# Patient Record
Sex: FEMALE | Race: WHITE | NOT HISPANIC OR LATINO | Employment: FULL TIME | URBAN - METROPOLITAN AREA
[De-identification: names, ages, dates, MRNs, and addresses within clinical notes are randomized per-mention and may not be internally consistent; named-entity substitution may affect disease eponyms.]

---

## 2017-02-13 ENCOUNTER — ALLSCRIPTS OFFICE VISIT (OUTPATIENT)
Dept: OTHER | Facility: OTHER | Age: 27
End: 2017-02-13

## 2018-01-14 VITALS
SYSTOLIC BLOOD PRESSURE: 110 MMHG | DIASTOLIC BLOOD PRESSURE: 80 MMHG | BODY MASS INDEX: 24.13 KG/M2 | WEIGHT: 150.13 LBS | HEIGHT: 66 IN | TEMPERATURE: 98.4 F

## 2018-03-13 RX ORDER — CETIRIZINE HYDROCHLORIDE 10 MG/1
1 TABLET ORAL DAILY
COMMUNITY

## 2018-03-13 RX ORDER — ALPRAZOLAM 0.25 MG/1
1 TABLET ORAL EVERY 8 HOURS PRN
COMMUNITY
Start: 2016-10-19 | End: 2020-10-18

## 2018-03-13 RX ORDER — NICOTINE POLACRILEX 2 MG
1 GUM BUCCAL DAILY
COMMUNITY
End: 2020-10-18

## 2018-03-13 RX ORDER — FLUTICASONE PROPIONATE 50 MCG
1 SPRAY, SUSPENSION (ML) NASAL 2 TIMES DAILY
COMMUNITY

## 2018-03-13 RX ORDER — LEVONORGESTREL AND ETHINYL ESTRADIOL AND ETHINYL ESTRADIOL 150-30(84)
1 KIT ORAL DAILY
Refills: 0 | COMMUNITY
Start: 2018-01-25 | End: 2020-10-18

## 2018-03-15 ENCOUNTER — OFFICE VISIT (OUTPATIENT)
Dept: FAMILY MEDICINE CLINIC | Facility: CLINIC | Age: 28
End: 2018-03-15
Payer: COMMERCIAL

## 2018-03-15 VITALS
DIASTOLIC BLOOD PRESSURE: 79 MMHG | SYSTOLIC BLOOD PRESSURE: 134 MMHG | WEIGHT: 153.25 LBS | HEIGHT: 66 IN | TEMPERATURE: 99.4 F | BODY MASS INDEX: 24.63 KG/M2 | HEART RATE: 88 BPM

## 2018-03-15 DIAGNOSIS — Z01.89 ENCOUNTER FOR ROUTINE LABORATORY TESTING: ICD-10-CM

## 2018-03-15 DIAGNOSIS — F41.9 ANXIETY: Primary | ICD-10-CM

## 2018-03-15 PROCEDURE — 99213 OFFICE O/P EST LOW 20 MIN: CPT | Performed by: FAMILY MEDICINE

## 2018-03-15 RX ORDER — SERTRALINE HYDROCHLORIDE 25 MG/1
25 TABLET, FILM COATED ORAL DAILY
Qty: 30 TABLET | Refills: 6 | Status: SHIPPED | OUTPATIENT
Start: 2018-03-15 | End: 2018-12-12 | Stop reason: SDUPTHER

## 2018-03-15 NOTE — PROGRESS NOTES
Assessment/Plan:    No problem-specific Assessment & Plan notes found for this encounter  Diagnoses and all orders for this visit:    Anxiety  -     TSH, 3rd generation with T4 reflex  -     sertraline (ZOLOFT) 25 mg tablet; Take 1 tablet (25 mg total) by mouth daily    Encounter for routine laboratory testing  -     CBC and differential  -     Comprehensive metabolic panel  -     Lipid panel  -     TSH, 3rd generation with T4 reflex  -     Urinalysis with reflex to microscopic    Other orders  -     ALPRAZolam (XANAX) 0 25 mg tablet; Take 1 tablet by mouth every 8 (eight) hours as needed  -     Biotin 1 MG CAPS; Take 1 capsule by mouth daily  -     fluticasone (FLONASE) 50 mcg/act nasal spray; 1 spray into each nostril 2 (two) times a day  -     ASHLYNA 0 15-0 03 &0 01 MG TABS; Take 1 tablet by mouth daily  -     cetirizine (ZYRTEC ALLERGY) 10 mg tablet; Take 1 tablet by mouth daily          Subjective:      Patient ID: Dick He is a 33 y o  female  Follow up for anxiety, pt uses xanax prn, but very infrequently, pt feels the anxiety is getting worse, she has a new job and responsibility, pt does not want to take xanax prn anymore and would like to try an SSRI medication        The following portions of the patient's history were reviewed and updated as appropriate: allergies, current medications, past family history, past medical history, past social history, past surgical history and problem list     Review of Systems   Constitutional: Negative for chills and fever  Respiratory: Negative for shortness of breath and wheezing  Cardiovascular: Negative for chest pain and palpitations  Psychiatric/Behavioral: Negative for suicidal ideas  Objective:      /79   Pulse 88   Temp 99 4 °F (37 4 °C)   Ht 5' 6" (1 676 m)   Wt 69 5 kg (153 lb 4 oz)   BMI 24 74 kg/m²          Physical Exam   Constitutional: She is oriented to person, place, and time   She appears well-developed and well-nourished  No distress  HENT:   Head: Normocephalic and atraumatic  Eyes: Conjunctivae and EOM are normal  Pupils are equal, round, and reactive to light  No scleral icterus  Neck: Normal range of motion  Neck supple  Cardiovascular: Normal rate, regular rhythm and normal heart sounds  No murmur heard  Pulmonary/Chest: Effort normal and breath sounds normal  No respiratory distress  She has no wheezes  She has no rales  Abdominal: Soft  Bowel sounds are normal  She exhibits no distension and no mass  There is no tenderness  There is no rebound and no guarding  Musculoskeletal: She exhibits no edema  Lymphadenopathy:     She has no cervical adenopathy  Neurological: She is alert and oriented to person, place, and time  Skin: Skin is warm and dry  No rash noted  She is not diaphoretic  No erythema  No pallor  Psychiatric: She has a normal mood and affect  Her behavior is normal  Judgment and thought content normal    Nursing note and vitals reviewed

## 2018-04-20 ENCOUNTER — OFFICE VISIT (OUTPATIENT)
Dept: FAMILY MEDICINE CLINIC | Facility: CLINIC | Age: 28
End: 2018-04-20
Payer: COMMERCIAL

## 2018-04-20 VITALS
OXYGEN SATURATION: 99 % | TEMPERATURE: 99.1 F | SYSTOLIC BLOOD PRESSURE: 108 MMHG | WEIGHT: 150 LBS | HEART RATE: 75 BPM | DIASTOLIC BLOOD PRESSURE: 70 MMHG | BODY MASS INDEX: 24.21 KG/M2

## 2018-04-20 DIAGNOSIS — Z00.00 ANNUAL PHYSICAL EXAM: Primary | ICD-10-CM

## 2018-04-20 DIAGNOSIS — E78.00 HYPERCHOLESTEROLEMIA: ICD-10-CM

## 2018-04-20 DIAGNOSIS — F41.9 ANXIETY: ICD-10-CM

## 2018-04-20 PROCEDURE — 99395 PREV VISIT EST AGE 18-39: CPT | Performed by: FAMILY MEDICINE

## 2018-04-20 NOTE — PROGRESS NOTES
Assessment/Plan:    No problem-specific Assessment & Plan notes found for this encounter  Diagnoses and all orders for this visit:    Anxiety    Hypercholesterolemia  Comments:  pt counseled on diet and exercise    Other orders  -     triamcinolone (KENALOG) 0 1 % ointment;           Subjective:      Patient ID: Yolette Price is a 33 y o  female  Pt is taking zoloft daily and states that she feels "100%" better since last visit, pt here for annual PE      Anxiety   Presents for follow-up visit  Patient reports no chest pain, nausea, palpitations, shortness of breath or suicidal ideas  The quality of sleep is good  Hyperlipidemia   This is a chronic problem  The current episode started more than 1 month ago  The problem is uncontrolled  Recent lipid tests were reviewed and are high  Pertinent negatives include no chest pain, myalgias or shortness of breath  The following portions of the patient's history were reviewed and updated as appropriate: allergies, current medications, past family history, past medical history, past social history, past surgical history and problem list     Review of Systems   Constitutional: Negative for chills, fatigue and fever  HENT: Negative  Eyes: Negative  Respiratory: Negative for shortness of breath and wheezing  Cardiovascular: Negative for chest pain and palpitations  Gastrointestinal: Negative for abdominal pain, diarrhea, nausea and vomiting  Endocrine: Negative  Genitourinary: Negative for dysuria  Musculoskeletal: Negative for arthralgias and myalgias  Skin: Negative  Allergic/Immunologic: Negative  Neurological: Negative for seizures and syncope  Hematological: Negative for adenopathy  Psychiatric/Behavioral: Negative  Negative for suicidal ideas           Objective:      /70   Pulse 75   Temp 99 1 °F (37 3 °C)   Wt 68 kg (150 lb)   SpO2 99%   BMI 24 21 kg/m²          Physical Exam   Constitutional: She is oriented to person, place, and time  She appears well-developed and well-nourished  No distress  HENT:   Head: Normocephalic and atraumatic  Right Ear: External ear normal    Left Ear: External ear normal    Nose: Nose normal    Mouth/Throat: Oropharynx is clear and moist    Eyes: Conjunctivae and EOM are normal  Pupils are equal, round, and reactive to light  No scleral icterus  Neck: Normal range of motion  Neck supple  Cardiovascular: Normal rate, regular rhythm and normal heart sounds  No murmur heard  Pulmonary/Chest: Effort normal and breath sounds normal  No respiratory distress  She has no wheezes  She has no rales  Abdominal: Soft  Bowel sounds are normal  She exhibits no distension and no mass  There is no tenderness  There is no rebound and no guarding  Musculoskeletal: Normal range of motion  She exhibits no edema  Lymphadenopathy:     She has no cervical adenopathy  Neurological: She is alert and oriented to person, place, and time  She has normal reflexes  She exhibits normal muscle tone  Skin: Skin is warm and dry  No rash noted  She is not diaphoretic  No erythema  No pallor  Psychiatric: She has a normal mood and affect  Her behavior is normal  Judgment and thought content normal    Nursing note and vitals reviewed

## 2018-06-06 ENCOUNTER — HOSPITAL ENCOUNTER (EMERGENCY)
Facility: HOSPITAL | Age: 28
Discharge: HOME | End: 2018-06-06
Attending: EMERGENCY MEDICINE | Admitting: EMERGENCY MEDICINE
Payer: COMMERCIAL

## 2018-06-06 ENCOUNTER — APPOINTMENT (EMERGENCY)
Dept: RADIOLOGY | Facility: HOSPITAL | Age: 28
End: 2018-06-06
Payer: COMMERCIAL

## 2018-06-06 VITALS
OXYGEN SATURATION: 96 % | BODY MASS INDEX: 24.33 KG/M2 | WEIGHT: 155 LBS | HEART RATE: 69 BPM | TEMPERATURE: 98.3 F | SYSTOLIC BLOOD PRESSURE: 129 MMHG | HEIGHT: 67 IN | DIASTOLIC BLOOD PRESSURE: 84 MMHG | RESPIRATION RATE: 18 BRPM

## 2018-06-06 DIAGNOSIS — S93.409A ANKLE SPRAIN: Primary | ICD-10-CM

## 2018-06-06 PROCEDURE — 73610 X-RAY EXAM OF ANKLE: CPT

## 2018-06-06 PROCEDURE — 99283 EMERGENCY DEPT VISIT LOW MDM: CPT

## 2018-06-06 PROCEDURE — 73630 X-RAY EXAM OF FOOT: CPT

## 2018-06-06 RX ORDER — IBUPROFEN 600 MG/1
600 TABLET ORAL EVERY 6 HOURS PRN
Qty: 28 TABLET | Refills: 0 | Status: SHIPPED | OUTPATIENT
Start: 2018-06-06 | End: 2020-10-18

## 2018-06-06 RX ORDER — IBUPROFEN 400 MG/1
400 TABLET ORAL ONCE
Status: COMPLETED | OUTPATIENT
Start: 2018-06-06 | End: 2018-06-06

## 2018-06-06 RX ADMIN — IBUPROFEN 400 MG: 400 TABLET, FILM COATED ORAL at 18:52

## 2018-06-06 NOTE — ED NOTES
Anant Fuentes getting crutches     [facility], [address] [facility]  06/06/18 1935

## 2018-06-06 NOTE — DISCHARGE INSTRUCTIONS

## 2018-06-07 NOTE — ED PROVIDER NOTES
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 Taking? ALPRAZolam (XANAX) 0 25 mg tablet  Self Yes Yes   Sig: Take 1 tablet by mouth every 8 (eight) hours as needed   ASHLYNA 0 15-0 03 &0 01 MG TABS  Self Yes Yes   Sig: Take 1 tablet by mouth daily   Biotin 1 MG CAPS  Self Yes Yes   Sig: Take 1 capsule by mouth daily   cetirizine (ZYRTEC ALLERGY) 10 mg tablet  Self Yes Yes   Sig: Take 1 tablet by mouth daily   fluticasone (FLONASE) 50 mcg/act nasal spray  Self Yes Yes   Si spray into each nostril 2 (two) times a day   sertraline (ZOLOFT) 25 mg tablet  Self No Yes   Sig: Take 1 tablet (25 mg total) by mouth daily   triamcinolone (KENALOG) 0 1 % ointment  Self Yes Yes      Facility-Administered Medications: None       Past Medical History:   Diagnosis Date    Environmental allergies     last assessed 3/13/16       History reviewed  No pertinent surgical history  Family History   Problem Relation Age of Onset    No Known Problems Mother      I have reviewed and agree with the history as documented  Social History   Substance Use Topics    Smoking status: Never Smoker    Smokeless tobacco: Never Used    Alcohol use Yes      Comment: occasional        Review of Systems   Constitutional: Negative for chills, diaphoresis and fever     Respiratory: Negative for cough, shortness of breath, wheezing and stridor  Cardiovascular: Negative for chest pain, palpitations and leg swelling  Gastrointestinal: Negative for abdominal pain, blood in stool, diarrhea, nausea and vomiting  Genitourinary: Negative for dysuria, frequency and urgency  Musculoskeletal: Negative for neck pain and neck stiffness  Skin: Negative for pallor and rash  Neurological: Negative for dizziness, syncope, weakness, light-headedness and headaches  All other systems reviewed and are negative  Physical Exam  Physical Exam   Constitutional: She is oriented to person, place, and time  She appears well-developed and well-nourished  HENT:   Head: Normocephalic and atraumatic  Eyes: Pupils are equal, round, and reactive to light  Neck: Neck supple  Cardiovascular: Normal rate, regular rhythm, normal heart sounds and intact distal pulses  Pulmonary/Chest: Effort normal and breath sounds normal  No respiratory distress  Abdominal: Soft  Bowel sounds are normal  There is no tenderness  Musculoskeletal: She exhibits edema and tenderness  Tenderness over the lateral mallelous on the right, tenderness on the bottom of the foot, no prox tib/fib tenderness   Neurological: She is alert and oriented to person, place, and time  Skin: Skin is warm and dry  Capillary refill takes less than 2 seconds  No rash noted  No erythema  Vitals reviewed        Vital Signs  ED Triage Vitals [06/06/18 1847]   Temperature Pulse Respirations Blood Pressure SpO2   98 3 °F (36 8 °C) 74 18 140/81 99 %      Temp Source Heart Rate Source Patient Position - Orthostatic VS BP Location FiO2 (%)   Tympanic Monitor -- Left arm --      Pain Score       8           Vitals:    06/06/18 1847 06/06/18 1920   BP: 140/81 129/84   Pulse: 74 69       Visual Acuity      ED Medications  Medications   ibuprofen (MOTRIN) tablet 400 mg (400 mg Oral Given 6/6/18 1852)       Diagnostic Studies  Results Reviewed     None XR foot 3+ views RIGHT   Final Result by Griffin Coello MD (06/06 1928)      No acute osseous abnormality  Workstation performed: [id number]         XR ankle 3+ views RIGHT   Final Result by Griffin Coello MD (06/06 1915)      Prominent tibiotalar joint effusion and lateral soft tissue swelling without an acute fracture  Workstation performed: [id number]                    Procedures  Static Splint Application  Date/Time: 6/6/2018 7:50 PM  Performed by: Arjun Shah by: Kyler Connolly     Patient location:  Bedside  Procedure performed by emergency physician: No (done by tech, evaluated by me afterwards)    Consent:     Consent obtained:  Verbal    Consent given by:  Patient    Risks discussed:  Discoloration, numbness, pain and swelling    Alternatives discussed:  No treatment  Universal protocol:     Patient identity confirmed:  Verbally with patient  Indication:     Indications: sprain/strain    Pre-procedure details:     Sensation:  Normal  Procedure details:     Laterality:  Right    Location:  Ankle    Ankle:  R ankle    Splint type: posterior short leg  Supplies:  Ortho-Glass  Post-procedure details:     Pain:  Unchanged    Sensation:  Normal    Neurovascular Exam: skin pink, capillary refill <2 sec, normal pulses and skin intact, warm, and dry      Patient tolerance of procedure: Tolerated well, no immediate complications             Phone Contacts  ED Phone Contact    ED Course                               MDM  CritCare Time    Disposition  Final diagnoses: Ankle sprain     Time reflects when diagnosis was documented in both MDM as applicable and the Disposition within this note     Time User Action Codes Description Comment    6/6/2018  7:55 PM Meghna Brennan Add [P88 [id number]] Ankle sprain       ED Disposition     ED Disposition Condition Comment    Discharge  Lendel Liner discharge to home/self care      Condition at discharge: Good Follow-up Information     Follow up With Specialties Details Why Contact Info Additional Information    Veneda Stage, DO Family Medicine  As needed [address] [facility]  [phone]       [address] [facility] Emergency Department Emergency Medicine  As needed, If symptoms worsen [address] [facility] [address], [address], [place], [place], Suhas Patterson [address] [facility] Orthopedic Surgery Schedule an appointment as soon as possible for a visit in 1 week  [address] [address] [phone]           Discharge Medication List as of 6/6/2018  7:55 PM      START taking these medications    Details   ibuprofen (MOTRIN) 600 mg tablet Take 1 tablet (600 mg total) by mouth every 6 (six) hours as needed for mild pain for up to 7 days, Starting Wed 6/6/2018, Until Wed 6/13/2018, Print         CONTINUE these medications which have NOT CHANGED    Details   ALPRAZolam (XANAX) 0 25 mg tablet Take 1 tablet by mouth every 8 (eight) hours as needed, Starting Wed 10/19/2016, Historical Med      ASHLYNA 0 15-0 03 &0 01 MG TABS Take 1 tablet by mouth daily, Starting Thu 1/25/2018, Historical Med      Biotin 1 MG CAPS Take 1 capsule by mouth daily, Historical Med      cetirizine (ZYRTEC ALLERGY) 10 mg tablet Take 1 tablet by mouth daily, Historical Med      fluticasone (FLONASE) 50 mcg/act nasal spray 1 spray into each nostril 2 (two) times a day, Historical Med      sertraline (ZOLOFT) 25 mg tablet Take 1 tablet (25 mg total) by mouth daily, Starting Thu 3/15/2018, Normal      triamcinolone (KENALOG) 0 1 % ointment Starting Mon 4/2/2018, Historical Med           No discharge procedures on file      ED Provider  Electronically Signed by           Héctor Sarah MD  06/07/18 2143       Héctor Sarah MD  06/12/18 1724

## 2018-10-09 ENCOUNTER — APPOINTMENT (EMERGENCY)
Dept: CT IMAGING | Facility: HOSPITAL | Age: 28
End: 2018-10-09
Payer: COMMERCIAL

## 2018-10-09 ENCOUNTER — HOSPITAL ENCOUNTER (EMERGENCY)
Facility: HOSPITAL | Age: 28
Discharge: HOME | End: 2018-10-09
Attending: EMERGENCY MEDICINE | Admitting: EMERGENCY MEDICINE
Payer: COMMERCIAL

## 2018-10-09 ENCOUNTER — OFFICE VISIT (OUTPATIENT)
Dept: URGENT CARE | Facility: CLINIC | Age: 28
End: 2018-10-09
Payer: COMMERCIAL

## 2018-10-09 VITALS
SYSTOLIC BLOOD PRESSURE: 140 MMHG | RESPIRATION RATE: 20 BRPM | WEIGHT: 158 LBS | OXYGEN SATURATION: 99 % | BODY MASS INDEX: 25.39 KG/M2 | DIASTOLIC BLOOD PRESSURE: 75 MMHG | HEART RATE: 82 BPM | HEIGHT: 66 IN | TEMPERATURE: 98.2 F

## 2018-10-09 VITALS
OXYGEN SATURATION: 99 % | HEART RATE: 106 BPM | BODY MASS INDEX: 25.07 KG/M2 | DIASTOLIC BLOOD PRESSURE: 86 MMHG | RESPIRATION RATE: 16 BRPM | HEIGHT: 66 IN | TEMPERATURE: 99.4 F | WEIGHT: 156 LBS | SYSTOLIC BLOOD PRESSURE: 122 MMHG

## 2018-10-09 DIAGNOSIS — K51.00 PANCOLITIS (HCC): Primary | ICD-10-CM

## 2018-10-09 DIAGNOSIS — R10.31 RIGHT LOWER QUADRANT ABDOMINAL PAIN: Primary | ICD-10-CM

## 2018-10-09 LAB
ALBUMIN SERPL BCP-MCNC: 3.8 G/DL (ref 3.5–5)
ALP SERPL-CCNC: 43 U/L (ref 46–116)
ALT SERPL W P-5'-P-CCNC: 18 U/L (ref 12–78)
ANION GAP SERPL CALCULATED.3IONS-SCNC: 7 MMOL/L (ref 4–13)
APTT PPP: 27 SECONDS (ref 24–36)
AST SERPL W P-5'-P-CCNC: 19 U/L (ref 5–45)
BASOPHILS # BLD AUTO: 0.03 THOUSANDS/ΜL (ref 0–0.1)
BASOPHILS NFR BLD AUTO: 0 % (ref 0–1)
BILIRUB SERPL-MCNC: 0.4 MG/DL (ref 0.2–1)
BUN SERPL-MCNC: 9 MG/DL (ref 5–25)
CALCIUM SERPL-MCNC: 8.8 MG/DL (ref 8.3–10.1)
CHLORIDE SERPL-SCNC: 101 MMOL/L (ref 100–108)
CLARITY, POC: CLEAR
CO2 SERPL-SCNC: 28 MMOL/L (ref 21–32)
COLOR, POC: YELLOW
CREAT SERPL-MCNC: 1 MG/DL (ref 0.6–1.3)
EOSINOPHIL # BLD AUTO: 0.09 THOUSAND/ΜL (ref 0–0.61)
EOSINOPHIL NFR BLD AUTO: 1 % (ref 0–6)
ERYTHROCYTE [DISTWIDTH] IN BLOOD BY AUTOMATED COUNT: 12.3 % (ref 11.6–15.1)
EXT BILIRUBIN, UA: NORMAL
EXT BLOOD URINE: NORMAL
EXT GLUCOSE, UA: NORMAL
EXT KETONES: NORMAL
EXT NITRITE, UA: NORMAL
EXT PH, UA: 5
EXT PREG TEST URINE: NORMAL
EXT PROTEIN, UA: NORMAL
EXT SPECIFIC GRAVITY, UA: 1.01
EXT UROBILINOGEN: 0.2
GFR SERPL CREATININE-BSD FRML MDRD: 77 ML/MIN/1.73SQ M
GLUCOSE SERPL-MCNC: 93 MG/DL (ref 65–140)
HCT VFR BLD AUTO: 43.3 % (ref 34.8–46.1)
HGB BLD-MCNC: 14.5 G/DL (ref 11.5–15.4)
HOLD SPECIMEN: NORMAL
IMM GRANULOCYTES # BLD AUTO: 0.01 THOUSAND/UL (ref 0–0.2)
IMM GRANULOCYTES NFR BLD AUTO: 0 % (ref 0–2)
INR PPP: 0.93 (ref 0.86–1.17)
LIPASE SERPL-CCNC: 93 U/L (ref 73–393)
LYMPHOCYTES # BLD AUTO: 1.55 THOUSANDS/ΜL (ref 0.6–4.47)
LYMPHOCYTES NFR BLD AUTO: 19 % (ref 14–44)
MCH RBC QN AUTO: 30.1 PG (ref 26.8–34.3)
MCHC RBC AUTO-ENTMCNC: 33.5 G/DL (ref 31.4–37.4)
MCV RBC AUTO: 90 FL (ref 82–98)
MONOCYTES # BLD AUTO: 1 THOUSAND/ΜL (ref 0.17–1.22)
MONOCYTES NFR BLD AUTO: 12 % (ref 4–12)
NEUTROPHILS # BLD AUTO: 5.45 THOUSANDS/ΜL (ref 1.85–7.62)
NEUTS SEG NFR BLD AUTO: 68 % (ref 43–75)
PLATELET # BLD AUTO: 211 THOUSANDS/UL (ref 149–390)
PMV BLD AUTO: 11.1 FL (ref 8.9–12.7)
POTASSIUM SERPL-SCNC: 3.5 MMOL/L (ref 3.5–5.3)
PROT SERPL-MCNC: 7.5 G/DL (ref 6.4–8.2)
PROTHROMBIN TIME: 11.9 SECONDS (ref 11.8–14.2)
RBC # BLD AUTO: 4.81 MILLION/UL (ref 3.81–5.12)
SODIUM SERPL-SCNC: 136 MMOL/L (ref 136–145)
WBC # BLD AUTO: 8.13 THOUSAND/UL (ref 4.31–10.16)
WBC # BLD EST: NORMAL 10*3/UL

## 2018-10-09 PROCEDURE — 85730 THROMBOPLASTIN TIME PARTIAL: CPT | Performed by: EMERGENCY MEDICINE

## 2018-10-09 PROCEDURE — 99284 EMERGENCY DEPT VISIT MOD MDM: CPT

## 2018-10-09 PROCEDURE — 85610 PROTHROMBIN TIME: CPT | Performed by: EMERGENCY MEDICINE

## 2018-10-09 PROCEDURE — 96360 HYDRATION IV INFUSION INIT: CPT

## 2018-10-09 PROCEDURE — 85025 COMPLETE CBC W/AUTO DIFF WBC: CPT

## 2018-10-09 PROCEDURE — 36415 COLL VENOUS BLD VENIPUNCTURE: CPT

## 2018-10-09 PROCEDURE — 81002 URINALYSIS NONAUTO W/O SCOPE: CPT | Performed by: EMERGENCY MEDICINE

## 2018-10-09 PROCEDURE — 83690 ASSAY OF LIPASE: CPT

## 2018-10-09 PROCEDURE — 96361 HYDRATE IV INFUSION ADD-ON: CPT

## 2018-10-09 PROCEDURE — 74177 CT ABD & PELVIS W/CONTRAST: CPT

## 2018-10-09 PROCEDURE — 81025 URINE PREGNANCY TEST: CPT | Performed by: EMERGENCY MEDICINE

## 2018-10-09 PROCEDURE — G0382 LEV 3 HOSP TYPE B ED VISIT: HCPCS | Performed by: EMERGENCY MEDICINE

## 2018-10-09 PROCEDURE — 80053 COMPREHEN METABOLIC PANEL: CPT

## 2018-10-09 RX ORDER — ONDANSETRON 4 MG/1
4 TABLET, ORALLY DISINTEGRATING ORAL EVERY 8 HOURS PRN
Qty: 12 TABLET | Refills: 0 | Status: SHIPPED | OUTPATIENT
Start: 2018-10-09 | End: 2020-10-18

## 2018-10-09 RX ADMIN — SODIUM CHLORIDE 1000 ML: 0.9 INJECTION, SOLUTION INTRAVENOUS at 18:51

## 2018-10-09 RX ADMIN — IOHEXOL 85 ML: 350 INJECTION, SOLUTION INTRAVENOUS at 19:47

## 2018-10-09 NOTE — PROGRESS NOTES
Assessment/Plan:    No problem-specific Assessment & Plan notes found for this encounter  Diagnoses and all orders for this visit:    Right lower quadrant abdominal pain  -     Transfer to other facility          Subjective:      Patient ID: Shelbi Abbasi is a 33 y o  female  Pt c/o N & V, diarrhea for 3 days, no vomiting or BM since this morning; took Imodium this morning  Also c/o diffuse abdominal pain, which, on examination, was most tender in RLQ      Abdominal Pain   This is a new problem  The current episode started in the past 7 days  The onset quality is gradual  The problem occurs constantly  The most recent episode lasted 3 days  The problem has been gradually worsening  The pain is located in the RLQ  The pain is at a severity of 4/10  The pain is moderate  The quality of the pain is sharp  The abdominal pain does not radiate  Associated symptoms include diarrhea, a fever (low grade), nausea and vomiting  Nothing aggravates the pain  She has tried nothing for the symptoms  The treatment provided no relief  The following portions of the patient's history were reviewed and updated as appropriate: current medications, past family history, past medical history, past social history, past surgical history and problem list     Review of Systems   Constitutional: Positive for fever (low grade)  Gastrointestinal: Positive for abdominal pain, diarrhea, nausea and vomiting  All other systems reviewed and are negative  Objective:      /86   Pulse (!) 106   Temp 99 4 °F (37 4 °C)   Resp 16   Ht 5' 6" (1 676 m)   Wt 70 8 kg (156 lb)   LMP 10/09/2018   SpO2 99%   BMI 25 18 kg/m²          Physical Exam   Constitutional: She is oriented to person, place, and time  She appears well-developed and well-nourished  HENT:   Nose: Nose normal    Eyes: Pupils are equal, round, and reactive to light  Neck: Normal range of motion  Cardiovascular: Normal rate      Pulmonary/Chest: Effort normal    Abdominal: Soft  Bowel sounds are normal  There is tenderness (diffuse, especially RLQ)  There is rebound and guarding  Neurological: She is alert and oriented to person, place, and time  Skin: Skin is warm and dry  Psychiatric: She has a normal mood and affect  Her behavior is normal  Judgment and thought content normal    Nursing note and vitals reviewed

## 2018-10-09 NOTE — ED PROVIDER NOTES
History  Chief Complaint   Patient presents with    Abdominal Pain     pt presents to ED from urgent care, c/o abdominal pain and diarrhea the past few days  This is a 16-year-old female who presents with right lower quadrant pain increasing over the past 5 days  No prior abdominal surgeries  She has had nausea vomiting and diarrhea she is currently having her menstrual   Denies any chance of pregnancy  No fevers or chills  She was seen at urgent care and sent here for further evaluation to rule out appendicitis  History provided by:  Patient  Abdominal Pain   Pain location:  RLQ  Pain quality: aching    Pain radiates to:  Does not radiate  Pain severity:  Moderate  Onset quality:  Gradual  Duration:  5 days  Progression:  Worsening  Chronicity:  New  Relieved by:  Nothing  Worsened by:  Palpation  Associated symptoms: diarrhea, nausea and vomiting    Associated symptoms: no fever    Risk factors: has not had multiple surgeries        Prior to Admission Medications   Prescriptions Last Dose Informant Patient Reported? Taking?    ALPRAZolam (XANAX) 0 25 mg tablet  Self Yes No   Sig: Take 1 tablet by mouth every 8 (eight) hours as needed   ASHLYNA 0 15-0 03 &0 01 MG TABS  Self Yes No   Sig: Take 1 tablet by mouth daily   Biotin 1 MG CAPS  Self Yes No   Sig: Take 1 capsule by mouth daily   cetirizine (ZYRTEC ALLERGY) 10 mg tablet  Self Yes No   Sig: Take 1 tablet by mouth daily   fluticasone (FLONASE) 50 mcg/act nasal spray  Self Yes No   Si spray into each nostril 2 (two) times a day   ibuprofen (MOTRIN) 600 mg tablet   No No   Sig: Take 1 tablet (600 mg total) by mouth every 6 (six) hours as needed for mild pain for up to 7 days   sertraline (ZOLOFT) 25 mg tablet  Self No No   Sig: Take 1 tablet (25 mg total) by mouth daily   triamcinolone (KENALOG) 0 1 % ointment  Self Yes No      Facility-Administered Medications: None       Past Medical History:   Diagnosis Date    Environmental allergies last assessed 3/13/16       History reviewed  No pertinent surgical history  Family History   Problem Relation Age of Onset    No Known Problems Mother      I have reviewed and agree with the history as documented  Social History   Substance Use Topics    Smoking status: Never Smoker    Smokeless tobacco: Never Used    Alcohol use Yes      Comment: occasional        Review of Systems   Constitutional: Negative for fever  Gastrointestinal: Positive for abdominal pain, diarrhea, nausea and vomiting  All other systems reviewed and are negative  Physical Exam  Physical Exam   Constitutional: She is oriented to person, place, and time  She appears well-developed and well-nourished  No distress  HENT:   Head: Normocephalic and atraumatic  Right Ear: External ear normal    Left Ear: External ear normal    Nose: Nose normal    Mouth/Throat: Oropharynx is clear and moist    Eyes: Pupils are equal, round, and reactive to light  EOM are normal  No scleral icterus  Neck: Neck supple  No tracheal deviation present  Cardiovascular: Normal rate, regular rhythm, normal heart sounds and intact distal pulses  Exam reveals no gallop and no friction rub  No murmur heard  Pulmonary/Chest: Effort normal and breath sounds normal  No stridor  No respiratory distress  She has no wheezes  She has no rales  Abdominal: Soft  Bowel sounds are normal  She exhibits no distension  There is tenderness  There is no rebound and no guarding (Right lower quadrant)  Musculoskeletal: Normal range of motion  She exhibits no edema or deformity  Neurological: She is alert and oriented to person, place, and time  No cranial nerve deficit  Skin: Skin is warm and dry  No rash noted  She is not diaphoretic  Psychiatric: She has a normal mood and affect  Her behavior is normal  Thought content normal    Nursing note and vitals reviewed        Vital Signs  ED Triage Vitals [10/09/18 1746]   Temperature Pulse Respirations Blood Pressure SpO2   98 2 °F (36 8 °C) 83 16 151/79 100 %      Temp Source Heart Rate Source Patient Position - Orthostatic VS BP Location FiO2 (%)   Temporal Monitor Sitting Left arm --      Pain Score       7           Vitals:    10/09/18 1746 10/09/18 2005 10/09/18 2055   BP: 151/79 138/70 140/75   Pulse: 83 80 82   Patient Position - Orthostatic VS: Sitting         Visual Acuity      ED Medications  Medications   sodium chloride 0 9 % bolus 1,000 mL (0 mL Intravenous Stopped 10/9/18 2055)   iohexol (OMNIPAQUE) 350 MG/ML injection (MULTI-DOSE) 85 mL (85 mL Intravenous Given 10/9/18 1947)       Diagnostic Studies  Results Reviewed     Procedure Component Value Units Date/Time    POCT urinalysis dipstick [[id number]]  (Normal) Resulted:  10/09/18 1903    Lab Status:  Final result Specimen:  Urine Updated:  10/09/18 1904     Color, UA Yellow     Clarity, UA Clear     EXT Glucose, UA (Ref: Negative) Neg     EXT Bilirubin, UA (Ref: Negative) Neg     Ketones, UA (Ref: Negative) Neg     EXT Spec Grav, UA (Ref:1 003-1 030) 1 010     Blood, UA (Ref: Negative) Neg     EXT pH, UA (Ref: 4 5-8 0) 5 0     EXT Protein, UA (Ref: Negative) Neg     Urobilinogen, UA (Ref: 0 2- 1 0) 0 2      Leukocytes, UA (Ref: Negative) Neg     Nitrite, UA (Ref: Negative) Neg    POCT pregnancy, urine [[id number]]  (Normal) Resulted:  10/09/18 1901    Lab Status:  Final result Updated:  10/09/18 1903     EXT PREG TEST UR (Ref: Negative) NEG    Protime-INR [[id number]]  (Normal) Collected:  10/09/18 1751    Lab Status:  Final result Specimen:  Blood from Arm, Right Updated:  10/09/18 1857     Protime 11 9 seconds      INR 0 93    APTT [[id number]]  (Normal) Collected:  10/09/18 1751    Lab Status:  Final result Specimen:  Blood from Arm, Right Updated:  10/09/18 1857     PTT 27 seconds     Comprehensive metabolic panel [[id number]]  (Abnormal) Collected:  10/09/18 1751    Lab Status:  Final result Specimen:  Blood from Arm, Right Updated: 10/09/18 1823     Sodium 136 mmol/L      Potassium 3 5 mmol/L      Chloride 101 mmol/L      CO2 28 mmol/L      ANION GAP 7 mmol/L      BUN 9 mg/dL      Creatinine 1 00 mg/dL      Glucose 93 mg/dL      Calcium 8 8 mg/dL      AST 19 U/L      ALT 18 U/L      Alkaline Phosphatase 43 (L) U/L      Total Protein 7 5 g/dL      Albumin 3 8 g/dL      Total Bilirubin 0 40 mg/dL      eGFR 77 ml/min/1 73sq m     Narrative:         National Kidney Disease Education Program recommendations are as follows:  GFR calculation is accurate only with a steady state creatinine  Chronic Kidney disease less than 60 ml/min/1 73 sq  meters  Kidney failure less than 15 ml/min/1 73 sq  meters  Lipase [[id number]]  (Normal) Collected:  10/09/18 1751    Lab Status:  Final result Specimen:  Blood from Arm, Right Updated:  10/09/18 1823     Lipase 93 u/L     CBC and differential [[id number]]  (Normal) Collected:  10/09/18 1751    Lab Status:  Final result Specimen:  Blood from Arm, Right Updated:  10/09/18 1806     WBC 8 13 Thousand/uL      RBC 4 81 Million/uL      Hemoglobin 14 5 g/dL      Hematocrit 43 3 %      MCV 90 fL      MCH 30 1 pg      MCHC 33 5 g/dL      RDW 12 3 %      MPV 11 1 fL      Platelets 022 Thousands/uL      Neutrophils Relative 68 %      Immat GRANS % 0 %      Lymphocytes Relative 19 %      Monocytes Relative 12 %      Eosinophils Relative 1 %      Basophils Relative 0 %      Neutrophils Absolute 5 45 Thousands/µL      Immature Grans Absolute 0 01 Thousand/uL      Lymphocytes Absolute 1 55 Thousands/µL      Monocytes Absolute 1 00 Thousand/µL      Eosinophils Absolute 0 09 Thousand/µL      Basophils Absolute 0 03 Thousands/µL                  CT abdomen pelvis with contrast   Final Result by Va Navarro MD (10/09 1958)      Pancolitis              Workstation performed: [id number]                    Procedures  Procedures       Phone Contacts  ED Phone Contact    ED Course                               MDM  Number of Diagnoses or Management Options  Diagnosis management comments: Right lower quadrant pain differential includes acute appendicitis versus gastroenteritis or ovarian cyst will check labs and CT scan for further evaluation       Amount and/or Complexity of Data Reviewed  Clinical lab tests: ordered  Tests in the radiology section of CPT®: ordered      CritCare Time    Disposition  Final diagnoses:   Pancolitis ([address] )     Time reflects when diagnosis was documented in both MDM as applicable and the Disposition within this note     Time User Action Codes Description Comment    10/9/2018  7:04 PM Destinee Renato Add [R10 31] Right lower quadrant pain     10/9/2018  7:04 PM Candler Hany [R11 10] Vomiting     10/9/2018  7:05 PM Destinee Renato Add [R19 7] Diarrhea     10/9/2018  8:30 PM Tadevosyan, Meghna Modify [R11 10] Vomiting     10/9/2018  8:30 PM Tadevosyan, Meghna Remove [R10 31] Right lower quadrant pain     10/9/2018  8:30 PM Tadevosyan, Meghna Modify [R19 7] Diarrhea     10/9/2018  8:30 PM Tadevosyan, Meghna Remove [R11 10] Vomiting     10/9/2018  8:30 PM Tadevosyan, Meghna Remove [R19 7] Diarrhea     10/9/2018  8:30 PM Tadevosyan, Meghna Add [K51 00] Pancolitis [facility])       ED Disposition     ED Disposition Condition Comment    Discharge  Jeannette Willoughby discharge to home/self care      Condition at discharge: Good        Follow-up Information     Follow up With Specialties Details Why Contact Info Additional Information    [facility] Emergency Department Emergency Medicine  As needed, If symptoms worsen 450 [facility]  34[address] [facility] [address] [place] [address], [address], [place], [address] [facility], [address] [facility], 1000 [facility] Gastroenterology Call in 1 day make an appointment for tomorrow 250 [facility] 1000 [facility]  [phone]             Discharge Medication List as of 10/9/2018  8:34 PM      START taking these medications    Details   ondansetron (ZOFRAN-ODT) 4 mg disintegrating tablet Take 1 tablet (4 mg total) by mouth every 8 (eight) hours as needed for nausea or vomiting for up to 4 days, Starting Tue 10/9/2018, Until Sat 10/13/2018, Print         CONTINUE these medications which have NOT CHANGED    Details   ALPRAZolam (XANAX) 0 25 mg tablet Take 1 tablet by mouth every 8 (eight) hours as needed, Starting Wed 10/19/2016, Historical Med      ASHLYNA 0 15-0 03 &0 01 MG TABS Take 1 tablet by mouth daily, Starting Thu 1/25/2018, Historical Med      Biotin 1 MG CAPS Take 1 capsule by mouth daily, Historical Med      cetirizine (ZYRTEC ALLERGY) 10 mg tablet Take 1 tablet by mouth daily, Historical Med      fluticasone (FLONASE) 50 mcg/act nasal spray 1 spray into each nostril 2 (two) times a day, Historical Med      ibuprofen (MOTRIN) 600 mg tablet Take 1 tablet (600 mg total) by mouth every 6 (six) hours as needed for mild pain for up to 7 days, Starting Wed 6/6/2018, Until Wed 6/13/2018, Print      sertraline (ZOLOFT) 25 mg tablet Take 1 tablet (25 mg total) by mouth daily, Starting Thu 3/15/2018, Normal      triamcinolone (KENALOG) 0 1 % ointment Starting Mon 4/2/2018, Historical Med             Outpatient Discharge Orders  Clostridium difficile toxin by PCR   Standing Status: Future Number of Occurrences: 1 Standing Exp   Date: 10/09/19         ED Provider  Electronically Signed by           Aaron Richards DO  10/12/18 2059

## 2018-10-10 ENCOUNTER — APPOINTMENT (OUTPATIENT)
Dept: LAB | Facility: HOSPITAL | Age: 28
End: 2018-10-10
Attending: EMERGENCY MEDICINE
Payer: COMMERCIAL

## 2018-10-10 DIAGNOSIS — K51.00 PANCOLITIS (HCC): ICD-10-CM

## 2018-10-10 LAB — C DIFF TOX GENS STL QL NAA+PROBE: NORMAL

## 2018-10-10 PROCEDURE — 87493 C DIFF AMPLIFIED PROBE: CPT

## 2018-10-10 NOTE — DISCHARGE INSTRUCTIONS
Colitis   WHAT YOU NEED TO KNOW:   Colitis is swelling and irritation of your colon  Colitis may be caused by ulcers or a problem with your immune system  Bacteria, a virus, or a parasite may also cause colitis  The cause may not be known  You may have diarrhea, abdominal pain, fever, or blood or mucus in your bowel movement  DISCHARGE INSTRUCTIONS:   Return to the emergency department if:   · You have sudden trouble breathing  · Your bowel movements are black or have blood in them  · You have blood in your vomit  · You have severe abdominal pain or your abdomen is swollen and feels hard  · You have any of the following signs of dehydration:     ¨ Dizziness or weakness    ¨ Dry mouth, cracked lips, or severe thirst    ¨ Fast heartbeat or breathing    ¨ Urinating very little or not at all  Contact your healthcare provider if:   · Your symptoms get worse or do not go away  · You have a fever, chills, cough, or feel weak and achy  · You suddenly lose weight without trying  · You have questions or concerns about your condition or care  Medicines:   · Medicines  may be given to decrease inflammation in your colon and treat diarrhea  · Take your medicine as directed  Contact your healthcare provider if you think your medicine is not helping or if you have side effects  Tell him of her if you are allergic to any medicine  Keep a list of the medicines, vitamins, and herbs you take  Include the amounts, and when and why you take them  Bring the list or the pill bottles to follow-up visits  Carry your medicine list with you in case of an emergency  Manage your symptoms:   · Drink liquids as directed  to help prevent dehydration  Good liquids to drink include water, juice, and broth  Ask how much liquid to drink each day  You may need to drink an oral rehydration solution (ORS)  An ORS contains a balance of water, salt, and sugar to replace body fluids lost during diarrhea       · Eat a variety of healthy foods  Healthy foods include fruits, vegetables, whole-grain breads, beans, low-fat dairy products, lean meats, and fish  You may need to eat several small meals throughout the day instead of large meals  Avoid spicy foods, caffeine, chocolate, and foods high in fat  · Talk to your healthcare provider before you take NSAIDs  NSAIDs can cause worsen your symptoms if ulcers are causing your colitis  · Start to exercise when you feel better  Regular exercise helps your bowels work normally  Ask about the best exercise plan for you  Follow up with your healthcare provider as directed: You may need to return for a colonoscopy or other tests  Write down how often you have a bowel movements and what they look like  Bring this to your follow-up visits  Write down your questions so you remember to ask them during your visits  © 2017 [address] [employer] Information is for End User's use only and may not be sold, redistributed or otherwise used for commercial purposes  All illustrations and images included in CareNotes® are the copyrighted property of [employer]  or [employer]  The above information is an  only  It is not intended as medical advice for individual conditions or treatments  Talk to your doctor, nurse or pharmacist before following any medical regimen to see if it is safe and effective for you

## 2018-10-10 NOTE — ED CARE HANDOFF
Emergency Department Sign Out Note        Sign out and transfer of care from Dr Antunez Brought  See Separate Emergency Department note  The patient, Kal Speaker, was evaluated by the previous provider for Abd pain  Workup Completed:  Abdominal labs, waiting for CT scan to be read    ED Course / Workup Pending (followup):  Spoke with Dr Bhakti Hein from GI, no antibiotics, to see in office gosia nguyen script as outaptient       patient works in healthcare field  Discussed CT scan findings with GI                     ED Course as of Oct 10 0253   Tue Oct 09, 2018   2017 GI paged, waiting for call back      Procedures  MDM  CritCare Time      Disposition  Final diagnoses:   [facility])     Time reflects when diagnosis was documented in both MDM as applicable and the Disposition within this note     Time User Action Codes Description Comment    10/9/2018  7:04 PM Creed Snuffer Add [R10 31] Right lower quadrant pain     10/9/2018  7:04 PM Creed Snuffer Add [R11 10] Vomiting     10/9/2018  7:05 PM Creed Snuffer Add [R19 7] Diarrhea     10/9/2018  8:30 PM Tadevosyan, Meghna Modify [R11 10] Vomiting     10/9/2018  8:30 PM Tadevosyan, Meghna Remove [R10 31] Right lower quadrant pain     10/9/2018  8:30 PM Tadevosyan, Meghna Modify [R19 7] Diarrhea     10/9/2018  8:30 PM Tadevosyan, Meghna Remove [R11 10] Vomiting     10/9/2018  8:30 PM Tadevosyan, Meghna Remove [R19 7] Diarrhea     10/9/2018  8:30 PM Tadevosyan, Meghna Add [K51 00] [facility])       ED Disposition     ED Disposition Condition Comment    Discharge  Kal Speaker discharge to home/self care      Condition at discharge: Good        Follow-up Information     Follow up With Specialties Details Why Contact Info Additional Information    [address] Emergency Department Emergency Medicine  As needed, If symptoms worsen 450 [facility]  [address] [place] [address] [address] [place] [address] ED, [address], [address] Gastroenterology Call in 1 day make an appointment for tomorrow [address] [facility] [address]  [phone]           Discharge Medication List as of 10/9/2018  8:34 PM      START taking these medications    Details   ondansetron (ZOFRAN-ODT) 4 mg disintegrating tablet Take 1 tablet (4 mg total) by mouth every 8 (eight) hours as needed for nausea or vomiting for up to 4 days, Starting Tue 10/9/2018, Until Sat 10/13/2018, Print         CONTINUE these medications which have NOT CHANGED    Details   ALPRAZolam (XANAX) 0 25 mg tablet Take 1 tablet by mouth every 8 (eight) hours as needed, Starting Wed 10/19/2016, Historical Med      ASHLYNA 0 15-0 03 &0 01 MG TABS Take 1 tablet by mouth daily, Starting Thu 1/25/2018, Historical Med      Biotin 1 MG CAPS Take 1 capsule by mouth daily, Historical Med      cetirizine (ZYRTEC ALLERGY) 10 mg tablet Take 1 tablet by mouth daily, Historical Med      fluticasone (FLONASE) 50 mcg/act nasal spray 1 spray into each nostril 2 (two) times a day, Historical Med      ibuprofen (MOTRIN) 600 mg tablet Take 1 tablet (600 mg total) by mouth every 6 (six) hours as needed for mild pain for up to 7 days, Starting Wed 6/6/2018, Until Wed 6/13/2018, Print      sertraline (ZOLOFT) 25 mg tablet Take 1 tablet (25 mg total) by mouth daily, Starting Thu 3/15/2018, Normal      triamcinolone (KENALOG) 0 1 % ointment Starting Mon 4/2/2018, Historical Med             Outpatient Discharge Orders  Clostridium difficile toxin by PCR   Standing Status: Future  Standing Exp   Date: 10/09/19            ED Provider  Electronically Signed by     Mehrdad Rachel MD  10/10/18 8321

## 2018-11-13 ENCOUNTER — OFFICE VISIT (OUTPATIENT)
Dept: FAMILY MEDICINE CLINIC | Facility: CLINIC | Age: 28
End: 2018-11-13

## 2018-11-13 VITALS
HEIGHT: 66 IN | WEIGHT: 149.4 LBS | TEMPERATURE: 97.9 F | SYSTOLIC BLOOD PRESSURE: 110 MMHG | DIASTOLIC BLOOD PRESSURE: 78 MMHG | BODY MASS INDEX: 24.01 KG/M2

## 2018-11-13 DIAGNOSIS — F41.9 ANXIETY: Primary | ICD-10-CM

## 2018-11-13 DIAGNOSIS — Z13.6 SCREENING FOR CARDIOVASCULAR CONDITION: ICD-10-CM

## 2018-11-13 PROCEDURE — 99213 OFFICE O/P EST LOW 20 MIN: CPT | Performed by: FAMILY MEDICINE

## 2018-11-13 PROCEDURE — 3008F BODY MASS INDEX DOCD: CPT | Performed by: FAMILY MEDICINE

## 2018-11-13 PROCEDURE — 1036F TOBACCO NON-USER: CPT | Performed by: FAMILY MEDICINE

## 2018-11-13 NOTE — PROGRESS NOTES
Assessment/Plan:    No problem-specific Assessment & Plan notes found for this encounter  Diagnoses and all orders for this visit:    Anxiety  Comments:  no change in the medications    Screening for cardiovascular condition  -     CBC and differential; Future  -     Comprehensive metabolic panel; Future  -     TSH, 3rd generation with Free T4 reflex; Future  -     Lipid panel; Future    Other orders  -     Albuterol Sulfate 108 (90 Base) MCG/ACT AEPB; 1 applicator          Subjective:      Patient ID: Mary Pope is a 33 y o  female  Pt is on zoloft 25 mg daily and xanax prn which she is not even using      Anxiety   Presents for follow-up visit  Patient reports no chest pain, excessive worry, irritability, nervous/anxious behavior, palpitations, panic, restlessness, shortness of breath or suicidal ideas  Symptoms occur rarely  The severity of symptoms is mild  The following portions of the patient's history were reviewed and updated as appropriate: allergies, current medications, past family history, past medical history, past social history, past surgical history and problem list     Review of Systems   Constitutional: Negative for fatigue and irritability  Respiratory: Negative for shortness of breath and wheezing  Cardiovascular: Negative for chest pain and palpitations  Psychiatric/Behavioral: Negative for suicidal ideas  The patient is not nervous/anxious  Objective:      /78 (BP Location: Left arm, Patient Position: Sitting, Cuff Size: Adult)   Temp 97 9 °F (36 6 °C) (Tympanic)   Ht 5' 6" (1 676 m)   Wt 67 8 kg (149 lb 6 4 oz)   BMI 24 11 kg/m²          Physical Exam   Constitutional: She is oriented to person, place, and time  She appears well-developed and well-nourished  No distress  HENT:   Head: Normocephalic and atraumatic  Eyes: Pupils are equal, round, and reactive to light  Conjunctivae and EOM are normal  No scleral icterus     Neck: Normal range of motion  Neck supple  Cardiovascular: Normal rate, regular rhythm and normal heart sounds  No murmur heard  Pulmonary/Chest: Effort normal and breath sounds normal  No respiratory distress  She has no wheezes  She has no rales  Musculoskeletal: She exhibits no edema  Lymphadenopathy:     She has no cervical adenopathy  Neurological: She is alert and oriented to person, place, and time  She exhibits normal muscle tone  Skin: Skin is warm and dry  No rash noted  She is not diaphoretic  No erythema  No pallor  Psychiatric: She has a normal mood and affect  Her behavior is normal  Judgment and thought content normal    Nursing note and vitals reviewed

## 2018-12-12 DIAGNOSIS — F41.9 ANXIETY: ICD-10-CM

## 2018-12-12 RX ORDER — SERTRALINE HYDROCHLORIDE 25 MG/1
TABLET, FILM COATED ORAL
Qty: 30 TABLET | Refills: 5 | Status: SHIPPED | OUTPATIENT
Start: 2018-12-12 | End: 2020-10-18

## 2020-10-18 ENCOUNTER — HOSPITAL ENCOUNTER (EMERGENCY)
Facility: HOSPITAL | Age: 30
Discharge: HOME | End: 2020-10-18
Attending: EMERGENCY MEDICINE | Admitting: EMERGENCY MEDICINE
Payer: COMMERCIAL

## 2020-10-18 ENCOUNTER — APPOINTMENT (EMERGENCY)
Dept: RADIOLOGY | Facility: HOSPITAL | Age: 30
End: 2020-10-18
Payer: COMMERCIAL

## 2020-10-18 VITALS
DIASTOLIC BLOOD PRESSURE: 69 MMHG | RESPIRATION RATE: 18 BRPM | HEART RATE: 96 BPM | OXYGEN SATURATION: 98 % | TEMPERATURE: 98.1 F | SYSTOLIC BLOOD PRESSURE: 116 MMHG

## 2020-10-18 DIAGNOSIS — S93.402A SPRAIN OF LEFT ANKLE: Primary | ICD-10-CM

## 2020-10-18 PROCEDURE — 73610 X-RAY EXAM OF ANKLE: CPT

## 2020-10-18 PROCEDURE — 99283 EMERGENCY DEPT VISIT LOW MDM: CPT

## 2020-10-18 PROCEDURE — 99284 EMERGENCY DEPT VISIT MOD MDM: CPT | Performed by: PHYSICIAN ASSISTANT

## 2020-10-18 RX ORDER — IBUPROFEN 600 MG/1
600 TABLET ORAL ONCE
Status: COMPLETED | OUTPATIENT
Start: 2020-10-18 | End: 2020-10-18

## 2020-10-18 RX ADMIN — IBUPROFEN 600 MG: 600 TABLET, FILM COATED ORAL at 20:37

## 2021-09-01 ENCOUNTER — OCCMED (OUTPATIENT)
Dept: URGENT CARE | Facility: CLINIC | Age: 31
End: 2021-09-01

## 2021-09-01 DIAGNOSIS — Z02.1 PRE-EMPLOYMENT HEALTH SCREENING EXAMINATION: Primary | ICD-10-CM

## 2021-09-01 PROCEDURE — 86735 MUMPS ANTIBODY: CPT | Performed by: PHYSICIAN ASSISTANT

## 2021-09-01 PROCEDURE — 86480 TB TEST CELL IMMUN MEASURE: CPT | Performed by: PHYSICIAN ASSISTANT

## 2021-09-01 PROCEDURE — 86762 RUBELLA ANTIBODY: CPT | Performed by: PHYSICIAN ASSISTANT

## 2021-09-01 PROCEDURE — 86765 RUBEOLA ANTIBODY: CPT | Performed by: PHYSICIAN ASSISTANT

## 2021-09-01 PROCEDURE — 86787 VARICELLA-ZOSTER ANTIBODY: CPT | Performed by: PHYSICIAN ASSISTANT

## 2021-09-01 NOTE — PROGRESS NOTES
This encounter was created for [facility] orders only   [de-identified] : POST OP RICCO W/WO NAMRATA\par \par COLD TX, ANALGESICS PRN,,WOUND CARE REVIEWED\par \par START PENDULUM TID - PROGRESS TO AAROM 48 HOURS\par \par START PT WITHIN 7-14 DAYS\par \par START CARDIO 2 WEEKS PRN\par \par AVOID GYM, HEAVY LIFTING 6-12 WEEKS \par \par FU 6 WEEKS\par

## 2021-09-02 LAB
MEV IGG SER QL: NORMAL
MUV IGG SER QL: NORMAL
RUBV IGG SERPL IA-ACNC: >175 IU/ML
VZV IGG SER IA-ACNC: NORMAL

## 2021-09-03 LAB
GAMMA INTERFERON BACKGROUND BLD IA-ACNC: 0.02 IU/ML
M TB IFN-G BLD-IMP: NEGATIVE
M TB IFN-G CD4+ BCKGRND COR BLD-ACNC: 0.31 IU/ML
M TB IFN-G CD4+ BCKGRND COR BLD-ACNC: 0.33 IU/ML
MITOGEN IGNF BCKGRD COR BLD-ACNC: >10 IU/ML

## 2021-10-12 ENCOUNTER — IMMUNIZATIONS (OUTPATIENT)
Dept: FAMILY MEDICINE CLINIC | Facility: HOSPITAL | Age: 31
End: 2021-10-12

## 2021-10-12 DIAGNOSIS — Z23 ENCOUNTER FOR IMMUNIZATION: Primary | ICD-10-CM

## 2021-10-12 PROCEDURE — 0001A SARS-COV-2 / COVID-19 MRNA VACCINE (PFIZER-BIONTECH) 30 MCG: CPT

## 2021-10-12 PROCEDURE — 91300 SARS-COV-2 / COVID-19 MRNA VACCINE (PFIZER-BIONTECH) 30 MCG: CPT

## 2022-05-03 ENCOUNTER — COSMETIC (OUTPATIENT)
Dept: PLASTIC SURGERY | Facility: CLINIC | Age: 32
End: 2022-05-03

## 2022-05-03 VITALS
HEART RATE: 84 BPM | TEMPERATURE: 98.2 F | DIASTOLIC BLOOD PRESSURE: 78 MMHG | HEIGHT: 66 IN | SYSTOLIC BLOOD PRESSURE: 114 MMHG | BODY MASS INDEX: 23.63 KG/M2 | WEIGHT: 147 LBS

## 2022-05-03 DIAGNOSIS — Z41.1 ENCOUNTER FOR COSMETIC SURGERY: Primary | ICD-10-CM

## 2022-05-03 PROCEDURE — COSCON: Performed by: STUDENT IN AN ORGANIZED HEALTH CARE EDUCATION/TRAINING PROGRAM

## 2022-05-04 NOTE — PROGRESS NOTES
Plastic Surgery Consult    Reason for visit: Interested in breast augmentation      HPI:  Patient is a pleasant 27 y/o female who is interested in bilateral breast augmentation  She currently wears an A cup and would like to be full C cup  She has no medical conditions, no history of blood clots, and is done having children  She has never had a mammogram  She has never noted any nipple discharge, skin dimpling, or masses  ROS: 12 pt ROS negative, except as otherwise noted in HPI      PMH: none  FamHx: non-contrib  SurgHx: none  SocHx: no tobacco, social ETOH, done having children  Meds: no blood thinners  Allergies: dust mites, cinnamon, no drug allergies    PE:  Vitals:    05/03/22 0923   BP: 114/78   Pulse: 84   Temp: 98 2 °F (36 8 °C)       General: NC/AT, breathing comfortably on RA  Neuro: CN II-XII grossly intact, symmetric reflexes  HEENT: PERRLA, EOMI, external ears normal, no lesions or deformities, neck supple, trachea midline  Respiratory: CTAB, normal respiratory effort  Cardio: RRR, normal S1, S2, no murmur, rubs, gallops  GI: soft, non-tender, non-distended  Extremities/MSK: normal alignment, mobility, gait, no edema  Skin: no rashes, lesions, subcutaneous nodules    BMI 23    Breast exam  Small breasts bilaterally, left breast slightly wider than right  Normal nipple sensation  No masses, discharge, skin dimpling  More pseudoptosis on the right than left  Mild left chest wall prominence  Measurements    R  L  SN-N  21 cm  21 m  N-IMF  8 cm  8 cm  N-IMF (stretch)9 5 cm  9 5 cm  BW  12 5 cm 12 5 cm    Less than 2 cm superior skin pinch    A/P: 27 y/o female who is good candidate for bilateral breast augmentation  -Discussed risks, benefits, complications of silicone vs saline breast augmentation including but not limited to infection, bleeding, scarring, asymmetry, contour deformity, need for further future surgery, leak, rupture, capsular contracture, BII, ALCL   Discussed pocket placement in regards to retroglandular and submuscular and dual-plane with advantages and disadvantages of each including but not limited to capsular contracture and animation deformity   -Discussed operation and post-operative course  -Patient tried sizers on today  Currently wears A-cup would like to be full C cup  She states that the 450 cc are too big, would like between 350-425 cc and would rather be on the slightly larger side  Current selection: 393-383 cc silicone moderate- mod plus, submuscular placement   -Patient questions and concerns addressed, including cleavage   -Will email quote          Angi Villalobos MD   [facility] Plastic and Reconstructive Surgery   [address], [address]   [place], [address]   Office: [phone]

## 2022-07-27 ENCOUNTER — COSMETIC (OUTPATIENT)
Dept: PLASTIC SURGERY | Facility: CLINIC | Age: 32
End: 2022-07-27

## 2022-07-27 VITALS
TEMPERATURE: 98 F | WEIGHT: 150 LBS | HEIGHT: 66 IN | DIASTOLIC BLOOD PRESSURE: 76 MMHG | HEART RATE: 95 BPM | SYSTOLIC BLOOD PRESSURE: 134 MMHG | BODY MASS INDEX: 24.11 KG/M2

## 2022-07-27 DIAGNOSIS — Z41.1 ENCOUNTER FOR COSMETIC SURGERY: Primary | ICD-10-CM

## 2022-07-29 RX ORDER — DOCUSATE SODIUM 100 MG/1
100 CAPSULE, LIQUID FILLED ORAL 2 TIMES DAILY
Qty: 20 CAPSULE | Refills: 1 | Status: SHIPPED | OUTPATIENT
Start: 2022-07-29

## 2022-07-29 RX ORDER — ACETAMINOPHEN 500 MG
500 TABLET ORAL EVERY 4 HOURS PRN
Qty: 30 TABLET | Refills: 2 | Status: SHIPPED | OUTPATIENT
Start: 2022-07-29

## 2022-07-29 RX ORDER — ONDANSETRON 4 MG/1
4 TABLET, FILM COATED ORAL EVERY 8 HOURS PRN
Qty: 20 TABLET | Refills: 0 | Status: SHIPPED | OUTPATIENT
Start: 2022-07-29

## 2022-07-29 RX ORDER — OXYCODONE HYDROCHLORIDE 5 MG/1
5 TABLET ORAL EVERY 4 HOURS PRN
Qty: 30 TABLET | Refills: 0 | Status: SHIPPED | OUTPATIENT
Start: 2022-07-29

## 2022-07-30 NOTE — PROGRESS NOTES
PRS Note    Patient presents for preop for bilateral augmentation mammoplasty with silicone implants     -Discussed risks, benefits, complications of silicone vs saline breast augmentation including but not limited to infection, bleeding, scarring, asymmetry, contour deformity, need for further future surgery, leak, rupture, capsular contracture, BII, ALCL  Discussed pocket placement in regards to subglandular and submuscular and dual-plane with advantages and disadvantages of each including but not limited to capsular contracture and animation deformity  Patient is done having children     -Discussed operation and post-operative course  -Patient tried sizers on today for confirmation of size  Currently wears A-cup would like to be full C cup  She states that the 450 cc are too big, would like between 350-425 cc and would rather be on the slightly larger side  Final size selection: 664-346 cc silicone moderate- mod plus, submuscular placement     -Patient questions and concerns addressed, including cleavage and pseudoptosis  Discussed existing asymmetries including left chest wall prominence which will be magnified with augmentation  Patient acknowledged  All questions answered, concerns addressed  Consents obtained    Will order implants  Will send postop prescriptions to pharmacy today    Marito Doty MD   [facility]   [address], [address], [address]   Office: [phone]

## 2022-08-02 ENCOUNTER — APPOINTMENT (OUTPATIENT)
Dept: LAB | Facility: CLINIC | Age: 32
End: 2022-08-02
Payer: COMMERCIAL

## 2022-08-02 DIAGNOSIS — Z41.1 ENCOUNTER FOR COSMETIC SURGERY: ICD-10-CM

## 2022-08-02 LAB
ANION GAP SERPL CALCULATED.3IONS-SCNC: 3 MMOL/L (ref 4–13)
BASOPHILS # BLD AUTO: 0.02 THOUSANDS/ΜL (ref 0–0.1)
BASOPHILS NFR BLD AUTO: 1 % (ref 0–1)
BUN SERPL-MCNC: 15 MG/DL (ref 5–25)
CALCIUM SERPL-MCNC: 9.5 MG/DL (ref 8.4–10.2)
CHLORIDE SERPL-SCNC: 103 MMOL/L (ref 96–108)
CO2 SERPL-SCNC: 29 MMOL/L (ref 21–32)
CREAT SERPL-MCNC: 0.91 MG/DL (ref 0.6–1.3)
EOSINOPHIL # BLD AUTO: 0.14 THOUSAND/ΜL (ref 0–0.61)
EOSINOPHIL NFR BLD AUTO: 3 % (ref 0–6)
ERYTHROCYTE [DISTWIDTH] IN BLOOD BY AUTOMATED COUNT: 12.6 % (ref 11.6–15.1)
GFR SERPL CREATININE-BSD FRML MDRD: 84 ML/MIN/1.73SQ M
GLUCOSE P FAST SERPL-MCNC: 91 MG/DL (ref 65–99)
HCT VFR BLD AUTO: 41.9 % (ref 34.8–46.1)
HGB BLD-MCNC: 13.5 G/DL (ref 11.5–15.4)
IMM GRANULOCYTES # BLD AUTO: 0.01 THOUSAND/UL (ref 0–0.2)
IMM GRANULOCYTES NFR BLD AUTO: 0 % (ref 0–2)
LYMPHOCYTES # BLD AUTO: 1.9 THOUSANDS/ΜL (ref 0.6–4.47)
LYMPHOCYTES NFR BLD AUTO: 43 % (ref 14–44)
MCH RBC QN AUTO: 28.8 PG (ref 26.8–34.3)
MCHC RBC AUTO-ENTMCNC: 32.2 G/DL (ref 31.4–37.4)
MCV RBC AUTO: 90 FL (ref 82–98)
MONOCYTES # BLD AUTO: 0.39 THOUSAND/ΜL (ref 0.17–1.22)
MONOCYTES NFR BLD AUTO: 9 % (ref 4–12)
NEUTROPHILS # BLD AUTO: 1.98 THOUSANDS/ΜL (ref 1.85–7.62)
NEUTS SEG NFR BLD AUTO: 44 % (ref 43–75)
NRBC BLD AUTO-RTO: 0 /100 WBCS
PLATELET # BLD AUTO: 186 THOUSANDS/UL (ref 149–390)
PMV BLD AUTO: 11.5 FL (ref 8.9–12.7)
POTASSIUM SERPL-SCNC: 3.9 MMOL/L (ref 3.5–5.3)
RBC # BLD AUTO: 4.68 MILLION/UL (ref 3.81–5.12)
SODIUM SERPL-SCNC: 135 MMOL/L (ref 135–147)
WBC # BLD AUTO: 4.44 THOUSAND/UL (ref 4.31–10.16)

## 2022-08-02 PROCEDURE — 85025 COMPLETE CBC W/AUTO DIFF WBC: CPT

## 2022-08-02 PROCEDURE — 36415 COLL VENOUS BLD VENIPUNCTURE: CPT

## 2022-08-02 PROCEDURE — 80048 BASIC METABOLIC PNL TOTAL CA: CPT

## 2022-08-22 ENCOUNTER — APPOINTMENT (OUTPATIENT)
Dept: PREADMISSION TESTING | Facility: HOSPITAL | Age: 32
End: 2022-08-22
Payer: SELF-PAY

## 2022-08-26 RX ORDER — CETIRIZINE HYDROCHLORIDE 10 MG/1
10 TABLET ORAL AS NEEDED
COMMUNITY

## 2022-08-26 RX ORDER — FLUTICASONE PROPIONATE 50 MCG
1 SPRAY, SUSPENSION (ML) NASAL DAILY
COMMUNITY

## 2022-08-26 NOTE — PRE-PROCEDURE INSTRUCTIONS
Pre-Surgery Instructions:   Medication Instructions    cetirizine (ZyrTEC) 10 mg tablet Uses as needed - instructed may use up to night before surgery if needed     fluticasone (FLONASE) 50 mcg/act nasal spray Take day of surgery  Reviewed all medications and instructions for DOS  Reviewed all showering instructions and COVID visitation policy  Pt aware that [facility]  is location for DOS, instructed that pt pre op nurse will call on 8/30/22  to give specific instructions for DOS  Pt instructed to bring photo ID and insurance card for DOS, remove all jewelry and  NO valuables for DOS  Pt instructed to use only Tylenol between now 8/26/22  and DOS, NO NSAID products  Pt informed transport is needed for DOS due to receiving anesthesia  Instructed patient to minimize alcohol use prior to surgery  No alcohol 24 hours prior to surgery to reduce risk of dehydration  Pt verbalized understanding of all instructions given and reviewed for DOS  Pt fully vaccinated for COVID  Pt has cleanser and instructions of use for DOS from office   Pt did report has medication that have been ordered by surgeon for post op mgmt  My Surgical Experience    The following information was developed to assist you to prepare for your operation  What do I need to do before coming to the hospital?   Arrange for a responsible person to drive you to and from the hospital    Arrange care for your children at home  Children are not allowed in the recovery areas of the hospital   Plan to wear clothing that is easy to put on and take off  If you are having shoulder surgery, wear a shirt that buttons or zippers in the front  Bathing  o Shower the evening before and the morning of your surgery with an antibacterial soap   Please refer to the Pre Op Showering Instructions for Surgery Patients Sheet   o Remove nail polish and all body piercing jewelry  o Do not shave any body part for at least 24 hours before surgery-this includes face, arms, legs and upper body  Food  o Nothing to eat or drink after midnight the night before your surgery  This includes candy and chewing gum  o Exception: If your surgery is after 12:00pm (noon), you may have clear liquids such as 7-Up®, ginger ale, apple or cranberry juice, Jell-O®, water, or clear broth until 8:00 am  o Do not drink milk or juice with pulp on the morning before surgery  o Do not drink alcohol 24 hours before surgery  Medicine  o Follow instructions you received from your surgeon about which medicines you may take on the day of surgery  o If instructed to take medicine on the morning of surgery, take pills with just a small sip of water  Call your prescribing doctor for specific infroamtion on what to do if you take insulin    What should I bring to the hospital?    Bring:  Paige Barthel or a walker, if you have them, for foot or knee surgery   A list of the daily medicines, vitamins, minerals, herbals and nutritional supplements you take  Include the dosages of medicines and the time you take them each day   Glasses, dentures or hearing aids   Minimal clothing; you will be wearing hospital sleepwear   Photo ID; required to verify your identity   If you have a Living Will or Power of , bring a copy of the documents   If you have an ostomy, bring an extra pouch and any supplies you use    Do not bring   Medicines or inhalers   Money, valuables or jewelry    What other information should I know about the day of surgery?  Notify your surgeons if you develop a cold, sore throat, cough, fever, rash or any other illness   Report to the Ambulatory Surgical/Same Day Surgery Unit   You will be instructed to stop at Registration only if you have not been pre-registered   Inform your  fi they do not stay that they will be asked by the staff to leave a phone number where they can be reached   Be available to be reached before surgery   In the event the operating room schedule changes, you may be asked to come in earlier or later than expected    *It is important to tell your doctor and others involved in your health care if you are taking or have been taking any non-prescription drugs, vitamins, minerals, herbals or other nutritional supplements   Any of these may interact with some food or medicines and cause a reaction

## 2022-08-30 PROCEDURE — NC001 PR NO CHARGE: Performed by: PHYSICIAN ASSISTANT

## 2022-08-30 NOTE — H&P
H&P - Plastic Surgery   Bryson Bautista 28 y o  female MRN: [id number]  Unit/Bed#:  Encounter: [id number]           Assessment:  Encounter for cosmetic surgery   Plan:  BILATERAL BREAST AUGMENTATION (Bilateral Breast)        HPI:   Rohith Cifuentes is a 28y o  year old female who presents with for preop for bilateral augmentation mammoplasty with silicone implants      -Discussed risks, benefits, complications of silicone vs saline breast augmentation including but not limited to infection, bleeding, scarring, asymmetry, contour deformity, need for further future surgery, leak, rupture, capsular contracture, BII, ALCL  Discussed pocket placement in regards to subglandular and submuscular and dual-plane with advantages and disadvantages of each including but not limited to capsular contracture and animation deformity      Patient is done having children      -Discussed operation and post-operative course  -Patient tried sizers on today for confirmation of size      Currently wears A-cup would like to be full C cup  She states that the 450 cc are too big, would like between 350-425 cc and would rather be on the slightly larger side       Final size selection: 358-743 cc silicone moderate- mod plus, submuscular placement      -Patient questions and concerns addressed, including cleavage and pseudoptosis  Discussed existing asymmetries including left chest wall prominence which will be magnified with augmentation  Patient acknowledged  REVIEW OF SYSTEMS    GENERAL/CONSTITUTIONAL: The patient denies fever, fatigue, weakness, weight gain or weight loss  HEAD, EYES, EARS, NOSE AND THROAT: Eyes - The patient denies pain, redness, loss of vision, double or blurred vision and denies wearing glasses  The patient denies ringing in the ears, nosebleeds sinusitis, post nasal drip  Also denies frequent sore throats, hoarseness, painful swallowing    CARDIOVASCULAR: The patient denies chest pain, irregular heartbeats, palpitations, shortness of breath, heart murmurs, high blood pressure, cramps in his legs with walking, pain in his feet or toes at night or varicose veins  RESPIRATORY: The patient denies chronic cough, wheezing or night sweats  GASTROINTESTINAL: The patient denies decreased appetite, nausea, vomiting, diarrhea, constipation, blood in the stools  GENITOURINARY: The patient denies difficult urination, pain or burning with urination, blood in the urine  MUSCULOSKELETAL: The patient denies arm, thigh or calf cramps  No joint or muscle pain  No muscle weakness or tenderness  No joint swelling, neck pain, back pain or major orthopedic injuries  SKIN AND BREASTS: see hpi The patient denies easy bruising, skin redness, skin rash, hives  NEUROLOGIC: The patient denies headache, dizziness, fainting, memory loss  PSYCHIATRIC: The patient denies depression anxiety  ENDOCRINE: The patient denies intolerance to hot or cold temperature, flushing, fingernail changes, increased thirst, increased salt intake or decreased sexual desire  HEMATOLOGIC/LYMPHATIC: The patient denies anemia, bleeding tendency or clotting tendency  ALLERGIC/IMMUNOLOGIC: The patient denies rhinitis, asthma, skin sensitivity, latex allergies or sensitivity        Historical Information   Past Medical History:   Diagnosis Date    Anemia     8/26/22 Pt reports hx of anemia with childbirth - was treated with po iron - and no further issues    Chest pain due to GERD     8/26/22 Pt reports having chest discomfort due to GERD one month ago  - went to PCP for evaluation and EKG was within normal limits - no issues    Environmental allergies     last assessed 3/13/16    GERD (gastroesophageal reflux disease)     History of anxiety     Hyperlipidemia     8/26/22 Pt reports higher cholesterol - no medications needed    Irritable bowel syndrome     8/26/22 Pt reports situational IBS- colonosopy was fine    Seasonal allergies      Past Surgical History: Procedure Laterality Date    COLONOSCOPY      EGD      WISDOM TOOTH EXTRACTION       Social History   Social History     Substance and Sexual Activity   Alcohol Use Yes    Comment: occasional- 3x/month - wine     Social History     Substance and Sexual Activity   Drug Use No    Comment: Denies any drug use     Social History     Tobacco Use   Smoking Status Never Smoker   Smokeless Tobacco Never Used   Tobacco Comment    Never smoker or no tobacco use     Family History:   Family History   Problem Relation Age of Onset    No Known Problems Mother     Anesthesia problems Neg Hx        Meds/Allergies   No current facility-administered medications for this encounter      Current Outpatient Medications:     cetirizine (ZyrTEC) 10 mg tablet, Take 10 mg by mouth if needed, Disp: , Rfl:     fluticasone (FLONASE) 50 mcg/act nasal spray, 1 spray into each nostril daily One spray each nostril, Disp: , Rfl:     acetaminophen (TYLENOL) 500 mg tablet, Take 1 tablet (500 mg total) by mouth every 4 (four) hours as needed for mild pain or moderate pain, Disp: 30 tablet, Rfl: 2    Albuterol Sulfate 108 (90 Base) MCG/ACT AEPB, 1 applicator, Disp: , Rfl:     cetirizine (ZYRTEC ALLERGY) 10 mg tablet, Take 1 tablet by mouth daily, Disp: , Rfl:     docusate sodium (COLACE) 100 mg capsule, Take 1 capsule (100 mg total) by mouth 2 (two) times a day (Patient taking differently: Take 100 mg by mouth 2 (two) times a day 8/26/22 Pt reports using post op per surgeon instructions), Disp: 20 capsule, Rfl: 1    fluticasone (FLONASE) 50 mcg/act nasal spray, 1 spray into each nostril 2 (two) times a day, Disp: , Rfl:     ondansetron (ZOFRAN) 4 mg tablet, Take 1 tablet (4 mg total) by mouth every 8 (eight) hours as needed for nausea or vomiting (Patient taking differently: Take 4 mg by mouth every 8 (eight) hours as needed for nausea or vomiting 8/26/22 Pt reports using post op if needed), Disp: 20 tablet, Rfl: 0    oxyCODONE (Roxicodone) 5 immediate release tablet, Take 1 tablet (5 mg total) by mouth every 4 (four) hours as needed for severe pain Max Daily Amount: 30 mg (Patient taking differently: Take 5 mg by mouth every 4 (four) hours as needed for severe pain 8/26/22 Pt reports using this medication for post op pain mgmt per surgeon - not taking at this time), Disp: 30 tablet, Rfl: 0      Objective     General: NC/AT, breathing comfortably on RA  Neuro: CN II-XII grossly intact, symmetric reflexes  HEENT: PERRLA, EOMI, external ears normal, no lesions or deformities, neck supple, trachea midline  Respiratory: CTAB, normal respiratory effort  Cardio: RRR, normal S1, S2, no murmur, rubs, gallops  GI: soft, non-tender, non-distended  Extremities/MSK: normal alignment, mobility, gait, no edema  Skin: no rashes, lesions, subcutaneous nodules     BMI 23     Breast exam  Small breasts bilaterally, left breast slightly wider than right  Normal nipple sensation  No masses, discharge, skin dimpling  More pseudoptosis on the right than left  Mild left chest wall prominence      Measurements                          R                      L  SN-N               21 cm              21 m  N-IMF              8 cm                8 cm  N-IMF (stretch)9 5 cm             9 5 cm  BW                  12 5 cm           12 5 cm     Less than 2 cm superior skin pinch    Lab Results:   Lab Results   Component Value Date    WBC 4 44 08/02/2022    HGB 13 5 08/02/2022    HCT 41 9 08/02/2022    MCV 90 08/02/2022     08/02/2022          No results found for: TISSUECULT, WOUNDCULT      Imaging Studies:   No results found      EKG, Pathology, and Other Studies:   No results found for: FINALDX    No intake or output data in the 24 hours ending 08/30/22 1910    Invasive Devices  Report    Peripheral Intravenous Line  Duration           Peripheral IV 10/09/18 Right Antecubital 1421 days                VTE Prophylaxis: Sequential compression device (Venodyne) Code Status: No Order  Advance Directive and Living Will:      Power of :

## 2022-08-31 ENCOUNTER — ANESTHESIA (OUTPATIENT)
Dept: PERIOP | Facility: HOSPITAL | Age: 32
End: 2022-08-31
Payer: SELF-PAY

## 2022-08-31 ENCOUNTER — ANESTHESIA EVENT (OUTPATIENT)
Dept: PERIOP | Facility: HOSPITAL | Age: 32
End: 2022-08-31
Payer: SELF-PAY

## 2022-08-31 ENCOUNTER — HOSPITAL ENCOUNTER (OUTPATIENT)
Facility: HOSPITAL | Age: 32
Setting detail: OUTPATIENT SURGERY
Discharge: HOME | End: 2022-08-31
Attending: STUDENT IN AN ORGANIZED HEALTH CARE EDUCATION/TRAINING PROGRAM | Admitting: STUDENT IN AN ORGANIZED HEALTH CARE EDUCATION/TRAINING PROGRAM
Payer: SELF-PAY

## 2022-08-31 VITALS
BODY MASS INDEX: 24.59 KG/M2 | TEMPERATURE: 96.3 F | HEART RATE: 91 BPM | DIASTOLIC BLOOD PRESSURE: 65 MMHG | WEIGHT: 153 LBS | RESPIRATION RATE: 20 BRPM | HEIGHT: 66 IN | OXYGEN SATURATION: 98 % | SYSTOLIC BLOOD PRESSURE: 103 MMHG

## 2022-08-31 DIAGNOSIS — Z41.1 ENCOUNTER FOR COSMETIC SURGERY: Primary | ICD-10-CM

## 2022-08-31 PROBLEM — K21.9 GASTROESOPHAGEAL REFLUX DISEASE: Status: ACTIVE | Noted: 2022-08-31

## 2022-08-31 LAB
EXT PREGNANCY TEST URINE: NEGATIVE
EXT. CONTROL: NORMAL

## 2022-08-31 PROCEDURE — 81025 URINE PREGNANCY TEST: CPT | Performed by: STUDENT IN AN ORGANIZED HEALTH CARE EDUCATION/TRAINING PROGRAM

## 2022-08-31 PROCEDURE — L8600 IMPLANT BREAST SILICONE/EQ: HCPCS | Performed by: STUDENT IN AN ORGANIZED HEALTH CARE EDUCATION/TRAINING PROGRAM

## 2022-08-31 PROCEDURE — 19325 BREAST AUGMENTATION W/IMPLT: CPT | Performed by: STUDENT IN AN ORGANIZED HEALTH CARE EDUCATION/TRAINING PROGRAM

## 2022-08-31 DEVICE — SMOOTH MODERATE PLUS PROFILE XTRA 350CC  SMOOTH ROUND SILICONE
Type: IMPLANTABLE DEVICE | Site: BREAST | Status: FUNCTIONAL
Brand: MENTOR MEMORYGEL XTRA BREAST IMPLANT

## 2022-08-31 RX ORDER — GABAPENTIN 300 MG/1
300 CAPSULE ORAL ONCE
Status: COMPLETED | OUTPATIENT
Start: 2022-08-31 | End: 2022-08-31

## 2022-08-31 RX ORDER — SODIUM CHLORIDE, SODIUM LACTATE, POTASSIUM CHLORIDE, CALCIUM CHLORIDE 600; 310; 30; 20 MG/100ML; MG/100ML; MG/100ML; MG/100ML
INJECTION, SOLUTION INTRAVENOUS CONTINUOUS PRN
Status: DISCONTINUED | OUTPATIENT
Start: 2022-08-31 | End: 2022-08-31

## 2022-08-31 RX ORDER — ACETAMINOPHEN 325 MG/1
975 TABLET ORAL ONCE
Status: COMPLETED | OUTPATIENT
Start: 2022-08-31 | End: 2022-08-31

## 2022-08-31 RX ORDER — METOCLOPRAMIDE HYDROCHLORIDE 5 MG/ML
10 INJECTION INTRAMUSCULAR; INTRAVENOUS EVERY 6 HOURS PRN
Status: DISCONTINUED | OUTPATIENT
Start: 2022-08-31 | End: 2022-08-31 | Stop reason: HOSPADM

## 2022-08-31 RX ORDER — FENTANYL CITRATE/PF 50 MCG/ML
25 SYRINGE (ML) INJECTION
Status: DISCONTINUED | OUTPATIENT
Start: 2022-08-31 | End: 2022-08-31 | Stop reason: HOSPADM

## 2022-08-31 RX ORDER — HYDROMORPHONE HCL/PF 1 MG/ML
0.4 SYRINGE (ML) INJECTION
Status: DISCONTINUED | OUTPATIENT
Start: 2022-08-31 | End: 2022-08-31 | Stop reason: HOSPADM

## 2022-08-31 RX ORDER — SODIUM CHLORIDE, SODIUM LACTATE, POTASSIUM CHLORIDE, CALCIUM CHLORIDE 600; 310; 30; 20 MG/100ML; MG/100ML; MG/100ML; MG/100ML
125 INJECTION, SOLUTION INTRAVENOUS CONTINUOUS
Status: DISCONTINUED | OUTPATIENT
Start: 2022-08-31 | End: 2022-08-31 | Stop reason: HOSPADM

## 2022-08-31 RX ORDER — LIDOCAINE HYDROCHLORIDE AND EPINEPHRINE 10; 10 MG/ML; UG/ML
INJECTION, SOLUTION INFILTRATION; PERINEURAL AS NEEDED
Status: DISCONTINUED | OUTPATIENT
Start: 2022-08-31 | End: 2022-08-31 | Stop reason: HOSPADM

## 2022-08-31 RX ORDER — CEFAZOLIN SODIUM 1 G/50ML
SOLUTION INTRAVENOUS AS NEEDED
Status: DISCONTINUED | OUTPATIENT
Start: 2022-08-31 | End: 2022-08-31

## 2022-08-31 RX ORDER — LIDOCAINE HYDROCHLORIDE 10 MG/ML
INJECTION, SOLUTION EPIDURAL; INFILTRATION; INTRACAUDAL; PERINEURAL AS NEEDED
Status: DISCONTINUED | OUTPATIENT
Start: 2022-08-31 | End: 2022-08-31

## 2022-08-31 RX ORDER — NEOSTIGMINE METHYLSULFATE 1 MG/ML
INJECTION INTRAVENOUS AS NEEDED
Status: DISCONTINUED | OUTPATIENT
Start: 2022-08-31 | End: 2022-08-31

## 2022-08-31 RX ORDER — MIDAZOLAM HYDROCHLORIDE 2 MG/2ML
INJECTION, SOLUTION INTRAMUSCULAR; INTRAVENOUS AS NEEDED
Status: DISCONTINUED | OUTPATIENT
Start: 2022-08-31 | End: 2022-08-31

## 2022-08-31 RX ORDER — ROCURONIUM BROMIDE 10 MG/ML
INJECTION, SOLUTION INTRAVENOUS AS NEEDED
Status: DISCONTINUED | OUTPATIENT
Start: 2022-08-31 | End: 2022-08-31

## 2022-08-31 RX ORDER — BUPIVACAINE HYDROCHLORIDE 2.5 MG/ML
INJECTION, SOLUTION EPIDURAL; INFILTRATION; INTRACAUDAL AS NEEDED
Status: DISCONTINUED | OUTPATIENT
Start: 2022-08-31 | End: 2022-08-31 | Stop reason: HOSPADM

## 2022-08-31 RX ORDER — PROPOFOL 10 MG/ML
INJECTION, EMULSION INTRAVENOUS AS NEEDED
Status: DISCONTINUED | OUTPATIENT
Start: 2022-08-31 | End: 2022-08-31

## 2022-08-31 RX ORDER — DEXAMETHASONE SODIUM PHOSPHATE 10 MG/ML
INJECTION, SOLUTION INTRAMUSCULAR; INTRAVENOUS AS NEEDED
Status: DISCONTINUED | OUTPATIENT
Start: 2022-08-31 | End: 2022-08-31

## 2022-08-31 RX ORDER — CEPHALEXIN 500 MG/1
500 CAPSULE ORAL EVERY 6 HOURS SCHEDULED
Qty: 28 CAPSULE | Refills: 0 | Status: SHIPPED | OUTPATIENT
Start: 2022-08-31 | End: 2022-09-07

## 2022-08-31 RX ORDER — ONDANSETRON 2 MG/ML
4 INJECTION INTRAMUSCULAR; INTRAVENOUS ONCE AS NEEDED
Status: COMPLETED | OUTPATIENT
Start: 2022-08-31 | End: 2022-08-31

## 2022-08-31 RX ORDER — EPHEDRINE SULFATE 50 MG/ML
INJECTION INTRAVENOUS AS NEEDED
Status: DISCONTINUED | OUTPATIENT
Start: 2022-08-31 | End: 2022-08-31

## 2022-08-31 RX ORDER — HYDROMORPHONE HCL/PF 1 MG/ML
SYRINGE (ML) INJECTION AS NEEDED
Status: DISCONTINUED | OUTPATIENT
Start: 2022-08-31 | End: 2022-08-31

## 2022-08-31 RX ORDER — GLYCOPYRROLATE 0.2 MG/ML
INJECTION INTRAMUSCULAR; INTRAVENOUS AS NEEDED
Status: DISCONTINUED | OUTPATIENT
Start: 2022-08-31 | End: 2022-08-31

## 2022-08-31 RX ORDER — CEFAZOLIN SODIUM 1 G/50ML
1000 SOLUTION INTRAVENOUS ONCE
Status: DISCONTINUED | OUTPATIENT
Start: 2022-08-31 | End: 2022-08-31 | Stop reason: HOSPADM

## 2022-08-31 RX ORDER — OXYCODONE HYDROCHLORIDE 5 MG/1
5 TABLET ORAL EVERY 4 HOURS PRN
Status: DISCONTINUED | OUTPATIENT
Start: 2022-08-31 | End: 2022-08-31 | Stop reason: HOSPADM

## 2022-08-31 RX ORDER — ONDANSETRON 2 MG/ML
INJECTION INTRAMUSCULAR; INTRAVENOUS AS NEEDED
Status: DISCONTINUED | OUTPATIENT
Start: 2022-08-31 | End: 2022-08-31

## 2022-08-31 RX ORDER — FENTANYL CITRATE 50 UG/ML
INJECTION, SOLUTION INTRAMUSCULAR; INTRAVENOUS AS NEEDED
Status: DISCONTINUED | OUTPATIENT
Start: 2022-08-31 | End: 2022-08-31

## 2022-08-31 RX ADMIN — DEXAMETHASONE SODIUM PHOSPHATE 10 MG: 10 INJECTION, SOLUTION INTRAMUSCULAR; INTRAVENOUS at 12:59

## 2022-08-31 RX ADMIN — ONDANSETRON 4 MG: 2 INJECTION INTRAMUSCULAR; INTRAVENOUS at 15:13

## 2022-08-31 RX ADMIN — ROCURONIUM BROMIDE 30 MG: 50 INJECTION, SOLUTION INTRAVENOUS at 12:40

## 2022-08-31 RX ADMIN — FENTANYL CITRATE 50 MCG: 50 INJECTION INTRAMUSCULAR; INTRAVENOUS at 13:54

## 2022-08-31 RX ADMIN — METOCLOPRAMIDE HYDROCHLORIDE 10 MG: 5 INJECTION INTRAMUSCULAR; INTRAVENOUS at 17:18

## 2022-08-31 RX ADMIN — NEOSTIGMINE METHYLSULFATE 3 MG: 1 INJECTION INTRAVENOUS at 14:24

## 2022-08-31 RX ADMIN — EPHEDRINE SULFATE 5 MG: 50 INJECTION INTRAVENOUS at 14:23

## 2022-08-31 RX ADMIN — SODIUM CHLORIDE, SODIUM LACTATE, POTASSIUM CHLORIDE, AND CALCIUM CHLORIDE: .6; .31; .03; .02 INJECTION, SOLUTION INTRAVENOUS at 12:34

## 2022-08-31 RX ADMIN — ONDANSETRON 4 MG: 2 INJECTION INTRAMUSCULAR; INTRAVENOUS at 13:47

## 2022-08-31 RX ADMIN — SODIUM CHLORIDE 0.3 MCG/KG/HR: 900 INJECTION INTRAVENOUS at 12:52

## 2022-08-31 RX ADMIN — ACETAMINOPHEN 975 MG: 325 TABLET ORAL at 09:17

## 2022-08-31 RX ADMIN — PROPOFOL 200 MG: 10 INJECTION, EMULSION INTRAVENOUS at 12:40

## 2022-08-31 RX ADMIN — GLYCOPYRROLATE 0.4 MG: 0.2 INJECTION INTRAMUSCULAR; INTRAVENOUS at 14:24

## 2022-08-31 RX ADMIN — FENTANYL CITRATE 50 MCG: 50 INJECTION INTRAMUSCULAR; INTRAVENOUS at 12:58

## 2022-08-31 RX ADMIN — GABAPENTIN 300 MG: 300 CAPSULE ORAL at 09:17

## 2022-08-31 RX ADMIN — EPHEDRINE SULFATE 5 MG: 50 INJECTION INTRAVENOUS at 13:43

## 2022-08-31 RX ADMIN — CEFAZOLIN SODIUM 1000 MG: 1 SOLUTION INTRAVENOUS at 12:47

## 2022-08-31 RX ADMIN — HYDROMORPHONE HYDROCHLORIDE 0.5 MG: 1 INJECTION, SOLUTION INTRAMUSCULAR; INTRAVENOUS; SUBCUTANEOUS at 14:45

## 2022-08-31 RX ADMIN — HYDROMORPHONE HYDROCHLORIDE 0.5 MG: 1 INJECTION, SOLUTION INTRAMUSCULAR; INTRAVENOUS; SUBCUTANEOUS at 13:56

## 2022-08-31 RX ADMIN — MIDAZOLAM HYDROCHLORIDE 2 MG: 1 INJECTION, SOLUTION INTRAMUSCULAR; INTRAVENOUS at 12:35

## 2022-08-31 NOTE — DISCHARGE INSTRUCTIONS
Discharge instructions    -Do not drive or operate heavy machinery when taking narcotic pain medicine as it can cause drowsiness   -No showering for 48 hours  After 48 hours can remove dressings, shower  No submerging incisions (no baths, pools, hottubs)  -No strenuous activity, no heavy lifting (nothing over 5 lbs), nothing that increases heart rate as this can cause bruising and swelling   -Take all medicines as prescribed  -Take antibiotics to completion  -Resume regular diet and home medications  -Resume normal daily activities  -Call Plastic Surgery office to schedule post-op follow in 7 days        Neil Galindo MD   [address] [place] [address] Plastic and Reconstructive Surgery   [address]   [facility], [address] [facility] [address]   Office: [phone]

## 2022-08-31 NOTE — ANESTHESIA PREPROCEDURE EVALUATION
Procedure:  BILATERAL BREAST AUGMENTATION (Bilateral Breast)    Relevant Problems   GI/HEPATIC   (+) Gastroesophageal reflux disease         Physical Exam    Airway    Mallampati score: I  TM Distance: >3 FB  Neck ROM: full     Dental       Cardiovascular      Pulmonary      Other Findings        Anesthesia Plan  ASA Score- 2     Anesthesia Type- general with ASA Monitors  Additional Monitors:   Airway Plan:           Plan Factors-    Chart reviewed  Patient summary reviewed  Patient is not a current smoker  Patient did not smoke on day of surgery  Induction- intravenous  Postoperative Plan- Plan for postoperative opioid use  Planned trial extubation    Informed Consent- Anesthetic plan and risks discussed with patient  I personally reviewed this patient with the CRNA  Discussed and agreed on the Anesthesia Plan with the CRNA  Gisella Jack

## 2022-08-31 NOTE — OP NOTE
OPERATIVE REPORT  PATIENT NAME: Jojo Cabral    :  1990  MRN: [id number]  Pt Location: [facility] OR ROOM 15    SURGERY DATE: 2022    Surgeon(s) and Role:     * Marito Doty MD - Primary     * MATTEO RhoadesC - [address] MD - Assisting    Preop Diagnosis:  Encounter for cosmetic surgery [Z41 1]    Post-Op Diagnosis Codes:     * Encounter for cosmetic surgery [Z41 1]    Procedure(s) (LRB):  BILATERAL BREAST AUGMENTATION with silicone implants    Specimen(s):  * No specimens in log *    Estimated Blood Loss:   Minimal    Drains:  * No LDAs found *    Anesthesia Type:   General    Operative Indications:  Encounter for cosmetic surgery [Z41 1]  Bilateral hypomastia    Operative Findings:  Left breast implant: [place] XTRA smooth, moderate plus profile  RefOrelia Pallas [phone]  Right breast implant: [place] XTRA smooth, moderate plus profile  RefOrelia Pallas [id number]  Dual plane I placement      Complications:   None    Procedure and Technique:  Patient was transported to the operating room, transferred to the operating table in supine fashion  25 cc of local mixure (25 cc of 1% lidocaine with epi with 25 cc of normal saline) was used to locally anesthetize the marked incision sites  The patient's chest was prepped and draped in the normal sterile fashion  The nipples were covered with tegederm  I first proceeded with incising the marked IMF incision down to the pectoralis  The lateral border of the pectoralis major was identified  The subpectoral pocket was dissected  The inferior attachment of the muscle was released 1 cm above it's insertion  The submuscular dissection was performed bilaterally  Sizers were then placed and the patient was sat upright  It was decided that 593 cc smooth silicone implants would be placed   Prior to permanent implants being placed, hemostasis was achieved using electrocautery and the pockets were irrigated with 1:1 betadine:saline solution followed by iricept  The implants were inserted into the submusclar pocket using no-touch technique with schaeffer funnel  The patient was again reflexed and assessed with good size and symmetry  The scarpas fascia was closed with 2-0 vicryl  The dermis closed with 3-0 vicyrl and skin with 4-0 monocryl  Exofin skin glue was applied overlying the incision  Patient tolerated the procedure well without complications  At the end of the case, all sponge, needle, and instrument counts were correct  Patient was awakened from anesthesia and taken to PACU in stable condition      Patient Disposition:  PACU       SIGNATURE: Mian Begum MD  DATE: August 31, 2022  TIME: 2:29 PM

## 2022-08-31 NOTE — DISCHARGE INSTR - AVS FIRST PAGE
Surgery Date: 8/31/2022                Patient: Dat Pagan  Surgeon: Dr Tami Nevarez     Postoperative Instructions   Breast Augmentation    Dressings:  [x] Skin glue was applied to your incision over absorbable sutures  You may feel small pieces of suture at the ends of your incision  [x] Remove dressing the second morning following your surgery and bathe as directed  [x] No dressings are required but you may cover the incision with gauze for comfort  [x] Wear bra at all times except to shower  [] Other instructions:     Bathing:  [x] Shower 48 hours after surgery  Allow soap and water to gently wash over the incision  No scrubbing  Gently pat dry and apply dressing as needed/instructed above  [x] No submerging incision in bathtub, pool, hot tub and/or lake  Activity:  [x] No heavy lifting (> 10lbs)  [x] No strenuous exercise  [x] Walking is permitted and encouraged  [x] Strict sun avoidance/protection of incision site  [] Other instructions:     Medication:  [x] Resume preoperative medications  [x] Pain medication as prescribed  Ok to use Tylenol for pain control  You may also use ibuprofen 48 hours after surgery  [x] Finish all antibiotics as prescribed  [x] You may not drive until off your pain medications  [x] Apply ice to area as needed for pain  Do not place ice directly on skin  [] Other instructions: It is expected to have some bruising, swelling and mild oozing at the incision site and the surrounding area  If there is more than you expect or you suspect an infection, please call the office  Some patients may experience a low-grade fever after surgery  If it is above 100 4, please call the office  If you do not have a postoperative office appointment scheduled, please call the office today and let the staff know Dr Tami Nevarez needs to see you in 7 days  Please call [phone] with any questions, concerns or changes

## 2022-08-31 NOTE — ANESTHESIA POSTPROCEDURE EVALUATION
Post-Op Assessment Note    CV Status:  Stable  Pain Score: 0    Pain management: adequate     Mental Status:  Alert and awake   Hydration Status:  Euvolemic   PONV Controlled:  Controlled   Airway Patency:  Patent      Post Op Vitals Reviewed: Yes      Staff: Anesthesiologist, CRNA         No complications documented      BP   105/73   Temp (!) 97 °F (36 1 °C) (08/31/22 1445)    Pulse  77   Resp   14   SpO2   99

## 2022-09-07 ENCOUNTER — OFFICE VISIT (OUTPATIENT)
Dept: PLASTIC SURGERY | Facility: CLINIC | Age: 32
End: 2022-09-07

## 2022-09-07 DIAGNOSIS — Z41.1 ENCOUNTER FOR COSMETIC SURGERY: Primary | ICD-10-CM

## 2022-09-07 PROCEDURE — RECHECK: Performed by: PHYSICIAN ASSISTANT

## 2022-09-07 NOTE — PROGRESS NOTES
Assessment/Plan:     Patient is a 73-year-old female who is status post bilateral breast augmentation with Dr Mercado Laurie on 08/31/2022  Please see HPI  The patient presents to the office today for 1st postoperative visit  She reports that she has had no issues postoperatively and has not required pain medication  The patient will return to the office in approximately 1 week for incision check  She was advised to call the office with any questions or concerns that arise prior to her next scheduled appointment  Diagnoses and all orders for this visit:    Encounter for cosmetic surgery          Subjective:     Patient ID: Jarvis Briones is a 28 y o  female  HPI     Pt reports she has been doing well  Pain has been very well controlled  No issues or concerns  She is eager to return to exercise but is understanding that she should wait  Review of Systems    See HPI    Objective:     Physical Exam      Bilateral incisions are clean, dry and intact  Breast are soft and grossly symmetric

## 2022-10-06 ENCOUNTER — TELEPHONE (OUTPATIENT)
Dept: PSYCHIATRY | Facility: CLINIC | Age: 32
End: 2022-10-06

## 2022-10-06 NOTE — TELEPHONE ENCOUNTER
Pt was placed on the employee wait list for med mgmt and will work on obtaining a referral from either her pcp or therapist

## 2022-10-10 ENCOUNTER — COSMETIC (OUTPATIENT)
Dept: PLASTIC SURGERY | Facility: CLINIC | Age: 32
End: 2022-10-10

## 2022-10-10 DIAGNOSIS — Z41.1 ENCOUNTER FOR COSMETIC SURGERY: Primary | ICD-10-CM

## 2022-10-10 PROCEDURE — RECHECK: Performed by: PHYSICIAN ASSISTANT

## 2022-10-10 NOTE — PROGRESS NOTES
Assessment/Plan:     Patient is a 81-year-old female who is status post bilateral breast augmentation with Dr Wang Garcia on 08/31/2022  Please see HPI  The patient returns to the office today for routine postoperative check  Overall, she is extremely pleased with her aesthetic results  The patient will continue silicone scar treatment  She will return to our office as needed  Diagnoses and all orders for this visit:    Encounter for cosmetic surgery          Subjective:     Patient ID: Randall Michaud is a 33 y o  female  HPI     Patient reports no issues or concerns today  Review of Systems    See HPI    Objective:     Physical Exam      Left lateral incisions are clean, dry and intact  All incisions have healed appropriately  Scarring is minimal   Breasts are soft, supple and grossly symmetric

## 2023-03-17 ENCOUNTER — TELEPHONE (OUTPATIENT)
Dept: PSYCHIATRY | Facility: CLINIC | Age: 33
End: 2023-03-17

## 2023-11-12 ENCOUNTER — AMB VIDEO VISIT (OUTPATIENT)
Dept: OTHER | Facility: HOSPITAL | Age: 33
End: 2023-11-12

## 2023-11-12 PROCEDURE — ECARE PR SL URGENT CARE VIRTUAL VISIT: Performed by: FAMILY MEDICINE

## 2023-11-12 NOTE — CARE ANYWHERE EVISITS
Visit Summary for Negar Pandya Lily - Gender: Female - Date of Birth: [id number]  Date: [id number] - Duration: 6 minutes  Patient: Negar Bautista  Provider: Shelley Velez    Patient Contact Information  Address  [address]; [address]  [phone]    Visit Topics  Had cold two weeks ago seemed to move to chest - cough is unbearable. [[facility] [id number]]    Triage Questions   What is your current physical address in the event of a medical emergency? Answer []  Are you allergic to any medications? Answer []  Are you now or could you be pregnant? Answer []  Do you have any immune system compromise or chronic lung   disease? Answer []  Do you have any vulnerable family members in the home (infant, pregnant, cancer, elderly)? Answer []     Conversation Transcripts  [0A][0A] [Notification] You are connected with Shelley Velez, Family Physician.[0A][Notification] Joycelyn Meneses is located in [place]. [0A][Notification] Joycelyn Meneses has shared health history. Yaquelin Chance .[0A]    Diagnosis  Unspecified acute lower respiratory infection  Wheezing    Procedures  Value: [id number] Code: CPT-4 UNLISTED E&M SERVICE    Medications Prescribed    albuterol sulfate  Dose : 2 puffs  Route : inhalation  Frequency : every 6 hours. Until directed to stop. Patient Instructions : prn wheezing  Refills : 0  Instructions to the Pharmacist : Substitutions allowed    doxycycline monohydrate  Dose : 1 capsule  Route : oral  Frequency : 2 times a day. Refills : 0  Instructions to the Pharmacist : Substitutions allowed    benzonatate  Dose : 1 capsule  Route : oral  Frequency : 3 times a day. Until directed to stop. Patient Instructions : prn cough  Refills : 0  Instructions to the Pharmacist : Substitutions allowed      Provider Notes  [0A][0A]  Mode of Communication:  Telemed video[0A]History: cough for 2 weeks. [0A][0A]Additional pertinent positives: persistent productive cough for 2 weeks.  Had head cold 2 weeks ago but no in chest. wheezing at times. productive discolored   phlemn[0A][0A]Pertinent negatives: no fever [0A]Past medical history:  PTSD[0A][0A]Medications:  prazoosin[0A][0A]Allergies:  nkda[0A][0A]Last menstrual period/pregnant (if applicable):  KY[4G]DHQJ: [0A][0A]Vitals signs (if available): afebrile   [0A][0A]Weight:158[0A][0A]General:Well appearing but has a cough which is persistent when it starts. Heent: no nasal drainage or sinus tenderness. Neck: supple. Resp: perisistent cough with production of phlem and some wheezing. Admits to anterior   upper chest discomfort with cough[0A]Assessment:lower respiratory infection unspecified. , wheezing[0A][0A]Diagnosis code:J22 [0A]Plan:  [0A]    Medications: [0A]    Doxycycline, Benzonatate, albuterol [0A]    Home care: [0A]    push fluids and rest [0A]      Referral or follow up: [0A]    with pcp if not better in 2 days or worsening [0A]    Medical decision making: persistent productive cough with some wheezing will treat for lower respiratory infection. [0A]    Antibiotic choice (if applicable):    Doxycycline [0A]Additional recommendations: [0A]    If you received a prescription at this visit and you have a question or problem please call [phone] for prescription assistance [0A]    Please print a copy of this note and send it to your regular   doctor, or take it to your next visit so it may be included in your medical record [0A]    Please see your primary care provider on an annual basis or more frequently if directed [0A]The patient voiced understanding and agreement with plan. [0A]    Electronically signed by: Jelly Mclain(NPI [id number])

## 2023-11-17 ENCOUNTER — APPOINTMENT (OUTPATIENT)
Dept: RADIOLOGY | Facility: CLINIC | Age: 33
End: 2023-11-17
Payer: COMMERCIAL

## 2023-11-17 ENCOUNTER — OFFICE VISIT (OUTPATIENT)
Dept: URGENT CARE | Facility: CLINIC | Age: 33
End: 2023-11-17
Payer: COMMERCIAL

## 2023-11-17 VITALS
OXYGEN SATURATION: 98 % | HEART RATE: 82 BPM | TEMPERATURE: 99 F | DIASTOLIC BLOOD PRESSURE: 72 MMHG | SYSTOLIC BLOOD PRESSURE: 110 MMHG | RESPIRATION RATE: 18 BRPM

## 2023-11-17 DIAGNOSIS — R05.1 ACUTE COUGH: ICD-10-CM

## 2023-11-17 DIAGNOSIS — R05.1 ACUTE COUGH: Primary | ICD-10-CM

## 2023-11-17 PROCEDURE — 71046 X-RAY EXAM CHEST 2 VIEWS: CPT

## 2023-11-17 PROCEDURE — G0382 LEV 3 HOSP TYPE B ED VISIT: HCPCS | Performed by: NURSE PRACTITIONER

## 2023-11-17 RX ORDER — METHYLPREDNISOLONE 4 MG/1
TABLET ORAL
Qty: 21 EACH | Refills: 0 | Status: SHIPPED | OUTPATIENT
Start: 2023-11-17

## 2023-11-17 NOTE — PROGRESS NOTES
[facility]        NAME: Elverna Fothergill is a 29 y.o. female  : 1990    MRN: [id number]  DATE: 2023  TIME: 6:04 PM    Assessment and Plan   Acute cough [R05.1]  1. Acute cough  XR chest pa & lateral    methylPREDNISolone 4 MG tablet therapy pack        Symptomatic X3 weeks associated with coughing spells currently taking Doxy twice daily x7 days Tessalon Perles and albuterol inhaler without relief chest x-ray provided in office today will heed radiology read. Will provide Medrol Dosepak educated on side effects proper use of medication follow-up with primary care or urgent care or ED with any worsening of symptoms no improvement    Patient Instructions       Follow up with PCP in 3-5 days. Proceed to  ER if symptoms worsen. Chief Complaint     Chief Complaint   Patient presents with   • Cough     Patient has had a cough for the last 3 weeks. Was given abx and cough meds on  and has not had any relief         History of Present Illness       Patient is a 71-year-old female arrives with complaints of over 3 weeks of coughing. Patient reports she did have some upper respiratory viral infection symptoms previously however now the cough is just lingering. She does report coughing spells where she cannot stop coughing. She denies any past medical history of asthma. She did do a virtual appointment through [facility] 2023 she was prescribed Doxy twice daily x7 days and Tessalon Perles as needed and an albuterol inhaler. Patient reports no improvement in symptoms at this point. Review of Systems   Review of Systems   Constitutional:  Negative for activity change, appetite change, chills, fatigue and fever. HENT:  Negative for congestion, postnasal drip, rhinorrhea, sneezing and sore throat. Respiratory:  Positive for cough. Negative for chest tightness, shortness of breath and wheezing. Cardiovascular:  Negative for chest pain and palpitations. Gastrointestinal:  Negative for abdominal pain, constipation, diarrhea, nausea and vomiting. Musculoskeletal:  Negative for arthralgias and myalgias. Skin:  Negative for color change, pallor and rash. Neurological:  Negative for dizziness, weakness, light-headedness and headaches. Hematological:  Negative for adenopathy. Psychiatric/Behavioral:  Negative for agitation and confusion.           Current Medications       Current Outpatient Medications:   •  methylPREDNISolone 4 MG tablet therapy pack, Use as directed on package, Disp: 21 each, Rfl: 0  •  acetaminophen (TYLENOL) 500 mg tablet, Take 1 tablet (500 mg total) by mouth every 4 (four) hours as needed for mild pain or moderate pain, Disp: 30 tablet, Rfl: 2  •  Albuterol Sulfate 108 (90 Base) MCG/ACT AEPB, 1 applicator, Disp: , Rfl:   •  cetirizine (ZyrTEC) 10 mg tablet, Take 1 tablet by mouth daily, Disp: , Rfl:   •  cetirizine (ZyrTEC) 10 mg tablet, Take 10 mg by mouth if needed, Disp: , Rfl:   •  docusate sodium (COLACE) 100 mg capsule, Take 1 capsule (100 mg total) by mouth 2 (two) times a day (Patient taking differently: Take 100 mg by mouth 2 (two) times a day 8/26/22 Pt reports using post op per surgeon instructions), Disp: 20 capsule, Rfl: 1  •  fluticasone (FLONASE) 50 mcg/act nasal spray, 1 spray into each nostril 2 (two) times a day, Disp: , Rfl:   •  fluticasone (FLONASE) 50 mcg/act nasal spray, 1 spray into each nostril daily One spray each nostril, Disp: , Rfl:   •  ondansetron (ZOFRAN) 4 mg tablet, Take 1 tablet (4 mg total) by mouth every 8 (eight) hours as needed for nausea or vomiting (Patient taking differently: Take 4 mg by mouth every 8 (eight) hours as needed for nausea or vomiting 8/26/22 Pt reports using post op if needed), Disp: 20 tablet, Rfl: 0  •  oxyCODONE (Roxicodone) 5 immediate release tablet, Take 1 tablet (5 mg total) by mouth every 4 (four) hours as needed for severe pain Max Daily Amount: 30 mg (Patient taking differently: Take 5 mg by mouth every 4 (four) hours as needed for severe pain 8/26/22 Pt reports using this medication for post op pain mgmt per surgeon - not taking at this time), Disp: 30 tablet, Rfl: 0    Current Allergies     Allergies as of 11/17/2023 - Reviewed 11/17/2023   Allergen Reaction Noted   • Cinnamon - food allergy Other (See Comments) 03/15/2018   • Dust mite extract Other (See Comments) 03/13/2016            The following portions of the patient's history were reviewed and updated as appropriate: allergies, current medications, past family history, past medical history, past social history, past surgical history and problem list.     Past Medical History:   Diagnosis Date   • Anemia     8/26/22 Pt reports hx of anemia with childbirth - was treated with po iron - and no further issues   • Chest pain due to GERD     8/26/22 Pt reports having chest discomfort due to GERD one month ago  - went to PCP for evaluation and EKG was within normal limits - no issues   • Environmental allergies     last assessed 3/13/16   • GERD (gastroesophageal reflux disease)    • History of anxiety    • Hyperlipidemia     8/26/22 Pt reports higher cholesterol - no medications needed   • Irritable bowel syndrome     8/26/22 Pt reports situational IBS- colonosopy was fine   • Seasonal allergies        Past Surgical History:   Procedure Laterality Date   • COLONOSCOPY     • EGD     • VT BREAST AUGMENTATION WITH IMPLANT Bilateral 8/31/2022    Procedure: BILATERAL BREAST AUGMENTATION;  Surgeon: Guillermina Obrien MD;  Location: [facility];  Service: Plastics   • WISDOM TOOTH EXTRACTION         Family History   Problem Relation Age of Onset   • No Known Problems Mother    • Anesthesia problems Neg Hx          Medications have been verified. Objective   /72   Pulse 82   Temp 99 °F (37.2 °C)   Resp 18   SpO2 98%   No LMP recorded. Physical Exam     Physical Exam  Vitals and nursing note reviewed. Constitutional:       General: She is not in acute distress. Appearance: Normal appearance. She is ill-appearing. She is not diaphoretic. HENT:      Head: Normocephalic and atraumatic. Right Ear: Tympanic membrane, ear canal and external ear normal. There is no impacted cerumen. Left Ear: Tympanic membrane, ear canal and external ear normal. There is no impacted cerumen. Nose: No congestion or rhinorrhea. Mouth/Throat:      Pharynx: No posterior oropharyngeal erythema. Eyes:      General: No scleral icterus. Right eye: No discharge. Left eye: No discharge. Conjunctiva/sclera: Conjunctivae normal.   Cardiovascular:      Rate and Rhythm: Normal rate and regular rhythm. Pulmonary:      Effort: Pulmonary effort is normal. No respiratory distress. Breath sounds: No stridor. No wheezing, rhonchi or rales. Chest:      Chest wall: No tenderness. Musculoskeletal:         General: Normal range of motion. Cervical back: Normal range of motion. Lymphadenopathy:      Cervical: No cervical adenopathy. Skin:     Coloration: Skin is not jaundiced or pale. Findings: No bruising, erythema or rash. Neurological:      General: No focal deficit present. Mental Status: She is alert and oriented to person, place, and time. Psychiatric:         Mood and Affect: Mood normal.         Behavior: Behavior normal.         Thought Content:  Thought content normal.         Judgment: Judgment normal.

## 2025-02-12 ENCOUNTER — HOSPITAL ENCOUNTER (EMERGENCY)
Facility: HOSPITAL | Age: 35
Discharge: HOME | End: 2025-02-12
Attending: EMERGENCY MEDICINE | Admitting: EMERGENCY MEDICINE
Payer: COMMERCIAL

## 2025-02-12 VITALS
TEMPERATURE: 98.3 F | BODY MASS INDEX: 26.33 KG/M2 | HEART RATE: 66 BPM | RESPIRATION RATE: 16 BRPM | SYSTOLIC BLOOD PRESSURE: 129 MMHG | DIASTOLIC BLOOD PRESSURE: 75 MMHG | WEIGHT: 163.8 LBS | HEIGHT: 66 IN | OXYGEN SATURATION: 100 %

## 2025-02-12 DIAGNOSIS — S00.83XA CONTUSION OF FOREHEAD, INITIAL ENCOUNTER: Primary | ICD-10-CM

## 2025-02-12 PROCEDURE — 99283 EMERGENCY DEPT VISIT LOW MDM: CPT

## 2025-02-12 PROCEDURE — 99283 EMERGENCY DEPT VISIT LOW MDM: CPT | Performed by: EMERGENCY MEDICINE

## 2025-02-13 NOTE — DISCHARGE INSTRUCTIONS
Sleep with head and shoulders elevated on extra pillows or in a recliner tonight.  If swelling is not much improved you should sleep with head and shoulders elevated again tomorrow night.    Apply cold compresses to this area frequently to help with swelling and pain.    You will likely have ecchymosis, black and blue appearance starting on the forehead.  This will likely slowly migrate down the right side of face due to gravity.    Avoid bending over or exerting yourself for several days, until swelling improves.    Return or see your PCP if problem arises.

## 2025-02-13 NOTE — ED PROVIDER NOTES
Time reflects when diagnosis was documented in both MDM as applicable and the Disposition within this note       Time User Action Codes Description Comment    2/12/2025  7:13 PM Gavino Mackay Add [S00.83XA] Contusion of forehead, initial encounter           ED Disposition       ED Disposition   Discharge    Condition   Stable    Date/Time   Wed Feb 12, 2025  7:13 PM    Comment   Katy Bautista discharge to home/self care.                   Assessment & Plan       Medical Decision Making  34-year-old female was laying on her daughter's bed reading with her daughter when the daughter accidentally kicked a shelf overhead.  A heavy ceramic pot fell approximately 3.5 feet and struck the patient's forehead.  Complains of pain and swelling of this area.  Denies loss of conscious, headache, vertigo, diplopia, change in hearing.  Denies any other trauma and any neurologic symptoms.  Not on blood thinners.             Medications - No data to display    ED Risk Strat Scores                          SBIRT 20yo+      Flowsheet Row Most Recent Value   Initial Alcohol Screen: US AUDIT-C     1. How often do you have a drink containing alcohol? 0 Filed at: 02/12/2025 1754   2. How many drinks containing alcohol do you have on a typical day you are drinking?  0 Filed at: 02/12/2025 1754   3a. Male UNDER 65: How often do you have five or more drinks on one occasion? 0 Filed at: 02/12/2025 1754   3b. FEMALE Any Age, or MALE 65+: How often do you have 4 or more drinks on one occassion? 0 Filed at: 02/12/2025 1754   Audit-C Score 0 Filed at: 02/12/2025 1754   CAROLE: How many times in the past year have you...    Used an illegal drug or used a prescription medication for non-medical reasons? Never Filed at: 02/12/2025 1754                            History of Present Illness       Chief Complaint   Patient presents with    Head Injury     Pt reports that a wall decoration (ceramic) fell off the wall and hit her in the head.  "Hematoma noted to right side of forehead. Patient reports pressure behind right eye. Headache 4/10, \"slight dizziness\"        Past Medical History:   Diagnosis Date    Anemia     8/26/22 Pt reports hx of anemia with childbirth - was treated with po iron - and no further issues    Chest pain due to GERD     8/26/22 Pt reports having chest discomfort due to GERD one month ago  - went to PCP for evaluation and EKG was within normal limits - no issues    Environmental allergies     last assessed 3/13/16    GERD (gastroesophageal reflux disease)     History of anxiety     Hyperlipidemia     8/26/22 Pt reports higher cholesterol - no medications needed    Irritable bowel syndrome     8/26/22 Pt reports situational IBS- colonosopy was fine    Seasonal allergies       Past Surgical History:   Procedure Laterality Date    COLONOSCOPY      EGD      LA BREAST AUGMENTATION WITH IMPLANT Bilateral 8/31/2022    Procedure: BILATERAL BREAST AUGMENTATION;  Surgeon: Brent Kumar MD;  Location: [facility];  Service: Plastics    WISDOM TOOTH EXTRACTION        Family History   Problem Relation Age of Onset    No Known Problems Mother     Anesthesia problems Neg Hx       Social History     Tobacco Use    Smoking status: Never    Smokeless tobacco: Never    Tobacco comments:     Never smoker or no tobacco use   Substance Use Topics    Alcohol use: Yes     Comment: occasional- 3x/month - wine    Drug use: No     Comment: Denies any drug use      E-Cigarette/Vaping    Comments Denies any use       E-Cigarette/Vaping Substances      I have reviewed and agree with the history as documented.     34-year-old female was laying on her daughter's bed reading with her daughter when the daughter accidentally kicked a shelf overhead.  A heavy ceramic pot fell approximately 3.5 feet and struck the patient's forehead.  Complains of pain and swelling of this area.  Denies loss of conscious, headache, vertigo, diplopia, change in hearing.  Denies any " other trauma and any neurologic symptoms.  Not on blood thinners.        Review of Systems   Constitutional:  Negative for fever.   Neurological:  Negative for dizziness, seizures, syncope, weakness, light-headedness, numbness and headaches.           Objective       ED Triage Vitals   Temperature Pulse Blood Pressure Respirations SpO2 Patient Position - Orthostatic VS   02/12/25 1753 02/12/25 1753 02/12/25 1753 02/12/25 1753 02/12/25 1753 02/12/25 1753   98.3 °F (36.8 °C) 66 129/75 16 100 % Sitting      Temp Source Heart Rate Source BP Location FiO2 (%) Pain Score    02/12/25 1753 02/12/25 1753 02/12/25 1753 -- 02/12/25 1755    Oral Monitor Right arm  4      Vitals      Date and Time Temp Pulse SpO2 Resp BP Pain Score FACES Pain Rating User   02/12/25 1755 -- -- -- -- -- 4 -- HR   02/12/25 1753 98.3 °F (36.8 °C) 66 100 % 16 129/75 -- -- HR            Physical Exam  Vitals and nursing note reviewed.   Constitutional:       General: She is not in acute distress.     Appearance: She is well-developed. She is not ill-appearing or diaphoretic.   HENT:      Head: Normocephalic.      Comments: Swelling tenderness overlying right frontal bone.  No crepitance or bony deformity.     Right Ear: Tympanic membrane, ear canal and external ear normal.      Left Ear: Tympanic membrane, ear canal and external ear normal.      Nose: Nose normal.   Eyes:      General: No scleral icterus.     Extraocular Movements: Extraocular movements intact.      Conjunctiva/sclera: Conjunctivae normal.      Pupils: Pupils are equal, round, and reactive to light.   Cardiovascular:      Rate and Rhythm: Normal rate and regular rhythm.      Pulses: Normal pulses.   Pulmonary:      Effort: Pulmonary effort is normal. No respiratory distress.   Abdominal:      Palpations: Abdomen is soft.      Tenderness: There is no abdominal tenderness.   Musculoskeletal:         General: No tenderness. Normal range of motion.      Cervical back: Neck supple. No  tenderness.      Right lower leg: No edema.      Left lower leg: No edema.   Skin:     General: Skin is warm and dry.      Capillary Refill: Capillary refill takes less than 2 seconds.      Findings: No rash.   Neurological:      General: No focal deficit present.      Mental Status: She is alert and oriented to person, place, and time. Mental status is at baseline.      Cranial Nerves: No cranial nerve deficit.      Sensory: No sensory deficit.      Motor: No weakness.      Coordination: Coordination normal.      Gait: Gait normal.      Deep Tendon Reflexes: Reflexes are normal and symmetric.   Psychiatric:         Mood and Affect: Mood normal.         Behavior: Behavior normal.         Results Reviewed       None            No orders to display       Procedures    ED Medication and Procedure Management   Prior to Admission Medications   Prescriptions Last Dose Informant Patient Reported? Taking?   Albuterol Sulfate 108 (90 Base) MCG/ACT AEPB   Yes No   Si applicator   acetaminophen (TYLENOL) 500 mg tablet   No No   Sig: Take 1 tablet (500 mg total) by mouth every 4 (four) hours as needed for mild pain or moderate pain   cetirizine (ZyrTEC) 10 mg tablet  Self Yes No   Sig: Take 1 tablet by mouth daily   cetirizine (ZyrTEC) 10 mg tablet   Yes No   Sig: Take 10 mg by mouth if needed   docusate sodium (COLACE) 100 mg capsule   No No   Sig: Take 1 capsule (100 mg total) by mouth 2 (two) times a day   Patient taking differently: Take 100 mg by mouth 2 (two) times a day 22 Pt reports using post op per surgeon instructions   fluticasone (FLONASE) 50 mcg/act nasal spray  Self Yes No   Si spray into each nostril 2 (two) times a day   fluticasone (FLONASE) 50 mcg/act nasal spray   Yes No   Si spray into each nostril daily One spray each nostril   methylPREDNISolone 4 MG tablet therapy pack   No No   Sig: Use as directed on package   ondansetron (ZOFRAN) 4 mg tablet   No No   Sig: Take 1 tablet (4 mg total)  by mouth every 8 (eight) hours as needed for nausea or vomiting   Patient taking differently: Take 4 mg by mouth every 8 (eight) hours as needed for nausea or vomiting 8/26/22 Pt reports using post op if needed   oxyCODONE (Roxicodone) 5 immediate release tablet   No No   Sig: Take 1 tablet (5 mg total) by mouth every 4 (four) hours as needed for severe pain Max Daily Amount: 30 mg   Patient taking differently: Take 5 mg by mouth every 4 (four) hours as needed for severe pain 8/26/22 Pt reports using this medication for post op pain mgmt per surgeon - not taking at this time      Facility-Administered Medications: None     Discharge Medication List as of 2/12/2025  7:31 PM        CONTINUE these medications which have NOT CHANGED    Details   acetaminophen (TYLENOL) 500 mg tablet Take 1 tablet (500 mg total) by mouth every 4 (four) hours as needed for mild pain or moderate pain, Starting Fri 7/29/2022, Normal      Albuterol Sulfate 108 (90 Base) MCG/ACT AEPB 1 applicator, Starting Fri 5/25/2018, Historical Med      !! cetirizine (ZyrTEC) 10 mg tablet Take 1 tablet by mouth daily, Historical Med      !! cetirizine (ZyrTEC) 10 mg tablet Take 10 mg by mouth if needed, Historical Med      docusate sodium (COLACE) 100 mg capsule Take 1 capsule (100 mg total) by mouth 2 (two) times a day, Starting Fri 7/29/2022, Normal      !! fluticasone (FLONASE) 50 mcg/act nasal spray 1 spray into each nostril 2 (two) times a day, Historical Med      !! fluticasone (FLONASE) 50 mcg/act nasal spray 1 spray into each nostril daily One spray each nostril, Historical Med      methylPREDNISolone 4 MG tablet therapy pack Use as directed on package, Normal      ondansetron (ZOFRAN) 4 mg tablet Take 1 tablet (4 mg total) by mouth every 8 (eight) hours as needed for nausea or vomiting, Starting Fri 7/29/2022, Normal      oxyCODONE (Roxicodone) 5 immediate release tablet Take 1 tablet (5 mg total) by mouth every 4 (four) hours as needed for  severe pain Max Daily Amount: 30 mg, Starting Fri 7/29/2022, Normal       !! - Potential duplicate medications found. Please discuss with provider.        No discharge procedures on file.  ED SEPSIS DOCUMENTATION   Time reflects when diagnosis was documented in both MDM as applicable and the Disposition within this note       Time User Action Codes Description Comment    2/12/2025  7:13 PM Gavino Mackay Add [S00.83XA] Contusion of forehead, initial encounter                  Gavino Mackay DO  02/12/25 221

## 2025-05-19 ENCOUNTER — OFFICE VISIT (OUTPATIENT)
Dept: URGENT CARE | Facility: MEDICAL CENTER | Age: 35
End: 2025-05-19
Payer: COMMERCIAL

## 2025-05-19 VITALS
OXYGEN SATURATION: 98 % | RESPIRATION RATE: 18 BRPM | TEMPERATURE: 98.4 F | BODY MASS INDEX: 25.58 KG/M2 | WEIGHT: 163 LBS | SYSTOLIC BLOOD PRESSURE: 122 MMHG | HEIGHT: 67 IN | DIASTOLIC BLOOD PRESSURE: 66 MMHG | HEART RATE: 75 BPM

## 2025-05-19 DIAGNOSIS — S00.432A HEMATOMA OF LEFT AURICULAR REGION: Primary | ICD-10-CM

## 2025-05-19 PROCEDURE — G0382 LEV 3 HOSP TYPE B ED VISIT: HCPCS

## 2025-05-19 RX ORDER — LEVOFLOXACIN 500 MG/1
500 TABLET, FILM COATED ORAL EVERY 24 HOURS
Qty: 7 TABLET | Refills: 0 | Status: SHIPPED | OUTPATIENT
Start: 2025-05-19 | End: 2025-05-26

## 2025-05-19 RX ORDER — FLUTICASONE PROPIONATE 50 MCG
1 SPRAY, SUSPENSION (ML) NASAL AS NEEDED
COMMUNITY

## 2025-05-19 NOTE — PROGRESS NOTES
[facility]        NAME: Katy Bautista is a 34 y.o. female  : 1990    MRN: [id number]  DATE: May 19, 2025  TIME: 3:38 PM    Assessment and Plan   Hematoma of left auricular region [S00.432A]  1. Hematoma of left auricular region  Ambulatory Referral to Otolaryngology    levofloxacin (LEVAQUIN) 500 mg tablet        Needle aspiration of hematoma not recommended at this point as it has been approximately 1 week since injury.  Advised ENT referral.  Prescribed course of Levaquin to cover for bacterial infection per literature.    Patient Instructions     Prescribed Levaquin, take as directed.  Over-the-counter pain medications such as Tylenol and/or ibuprofen as directed on packaging as needed for pain.  Recommend use of head gear with contact sports.  Referral to ENT placed for follow-up.    Follow up with PCP in 3-5 days.  Proceed to  ER if symptoms worsen.    If tests are performed, our office will contact you with results only if changes need to made to the care plan discussed with you at the visit. You can review your full results on [facility].    Chief Complaint     Chief Complaint   Patient presents with    Earache     Pt started to get cauliflower ear. Left ear is warm to touch, pt has headache, ear is red, inside is fill with fluid, hurts to lay on it.          History of Present Illness       Patient presents for evaluation of left ear pain.  Notes approximately 6 days ago she was hit into the mat while participating in [employer].  She does not wear any headgear.  Since then she has had swelling to the left ear cartilage.  States the upper area of the cartilage has hardened, the lower area of the cartilage still feels like there is fluid in it.  She feels recently the area started to become warm and red, she is now having headache as well.  She notes possible decrease in hearing, but may be overthinking this.    Earache   There is pain in the left ear. This is a new problem. The  current episode started in the past 7 days. The problem has been gradually worsening. There has been no fever. The pain is at a severity of 7/10 (with palpation to area). Associated symptoms include headaches. She has tried nothing (Patient continues to participate in [employer]) for the symptoms.       Review of Systems   Review of Systems   Constitutional:  Negative for chills and fever.   HENT:  Positive for ear pain.    Neurological:  Positive for headaches.         Current Medications     Current Medications[1]    Current Allergies     Allergies as of 05/19/2025 - Reviewed 05/19/2025   Allergen Reaction Noted    Cinnamon - food allergy Other (See Comments) 03/15/2018    Dust mite extract Other (See Comments) 03/13/2016            The following portions of the patient's history were reviewed and updated as appropriate: allergies, current medications, past family history, past medical history, past social history, past surgical history and problem list.     Past Medical History:   Diagnosis Date    Anemia     8/26/22 Pt reports hx of anemia with childbirth - was treated with po iron - and no further issues    Chest pain due to GERD     8/26/22 Pt reports having chest discomfort due to GERD one month ago  - went to PCP for evaluation and EKG was within normal limits - no issues    Environmental allergies     last assessed 3/13/16    GERD (gastroesophageal reflux disease)     History of anxiety     Hyperlipidemia     8/26/22 Pt reports higher cholesterol - no medications needed    Irritable bowel syndrome     8/26/22 Pt reports situational IBS- colonosopy was fine    Seasonal allergies        Past Surgical History:   Procedure Laterality Date    COLONOSCOPY      EGD      IL BREAST AUGMENTATION WITH IMPLANT Bilateral 8/31/2022    Procedure: BILATERAL BREAST AUGMENTATION;  Surgeon: Brent Kumar MD;  Location: [facility];  Service: Plastics    WISDOM TOOTH EXTRACTION         Family History   Problem Relation Age of  "Onset    No Known Problems Mother     Anesthesia problems Neg Hx          Medications have been verified.        Objective   /66   Pulse 75   Temp 98.4 °F (36.9 °C) (Temporal)   Resp 18   Ht 5' 6.5\" (1.689 m)   Wt 73.9 kg (163 lb)   SpO2 98%   BMI 25.91 kg/m²        Physical Exam     Physical Exam  Vitals and nursing note reviewed.   Constitutional:       General: She is not in acute distress.     Appearance: Normal appearance. She is not ill-appearing.   HENT:      Right Ear: Tympanic membrane, ear canal and external ear normal.      Left Ear: Tympanic membrane and ear canal normal. Swelling and tenderness present.      Ears:       Cardiovascular:      Rate and Rhythm: Normal rate and regular rhythm.      Pulses: Normal pulses.      Heart sounds: Normal heart sounds. No murmur heard.  Pulmonary:      Effort: Pulmonary effort is normal. No respiratory distress.      Breath sounds: Normal breath sounds. No stridor. No wheezing, rhonchi or rales.     Neurological:      Mental Status: She is alert.                        [1]   Current Outpatient Medications:     cetirizine (ZyrTEC) 10 mg tablet, Take 1 tablet by mouth in the morning., Disp: , Rfl:     cetirizine (ZyrTEC) 10 mg tablet, Take 10 mg by mouth if needed, Disp: , Rfl:     fluticasone (Flonase Allergy Rel Childrens) 50 mcg/act nasal spray, 1 spray into each nostril if needed, Disp: , Rfl:     fluticasone (FLONASE) 50 mcg/act nasal spray, 1 spray into each nostril in the morning and 1 spray in the evening., Disp: , Rfl:     fluticasone (FLONASE) 50 mcg/act nasal spray, 1 spray into each nostril in the morning. One spray each nostril., Disp: , Rfl:     levofloxacin (LEVAQUIN) 500 mg tablet, Take 1 tablet (500 mg total) by mouth every 24 hours for 7 days, Disp: 7 tablet, Rfl: 0    methylPREDNISolone 4 MG tablet therapy pack, Use as directed on package, Disp: 21 each, Rfl: 0    acetaminophen (TYLENOL) 500 mg tablet, Take 1 tablet (500 mg total) by " mouth every 4 (four) hours as needed for mild pain or moderate pain (Patient not taking: Reported on 5/19/2025), Disp: 30 tablet, Rfl: 2    Albuterol Sulfate 108 (90 Base) MCG/ACT AEPB, 1 applicator, Disp: , Rfl:     docusate sodium (COLACE) 100 mg capsule, Take 1 capsule (100 mg total) by mouth 2 (two) times a day (Patient not taking: Reported on 5/19/2025), Disp: 20 capsule, Rfl: 1    ondansetron (ZOFRAN) 4 mg tablet, Take 1 tablet (4 mg total) by mouth every 8 (eight) hours as needed for nausea or vomiting (Patient not taking: Reported on 5/19/2025), Disp: 20 tablet, Rfl: 0    oxyCODONE (Roxicodone) 5 immediate release tablet, Take 1 tablet (5 mg total) by mouth every 4 (four) hours as needed for severe pain Max Daily Amount: 30 mg (Patient not taking: Reported on 5/19/2025), Disp: 30 tablet, Rfl: 0

## (undated) DEVICE — ELECTRODE BLADE MOD E-Z CLEAN  2.75IN 7CM -0012AM

## (undated) DEVICE — PROXIMATE SKIN STAPLERS (35 WIDE) CONTAINS 35 STAINLESS STEEL STAPLES (FIXED HEAD): Brand: PROXIMATE

## (undated) DEVICE — PLUMEPEN PRO 10FT

## (undated) DEVICE — SUT VICRYL 3-0 SH 27 IN J416H

## (undated) DEVICE — ELECTRODE BLADE MOD  E-Z CLEAN 6.5IN -0014M

## (undated) DEVICE — NEEDLE SPINAL 22G X 3.5IN  QUINCKE

## (undated) DEVICE — FUNNEL E KELLER 2 DELIVERY DEVICE

## (undated) DEVICE — PACK UNIVERSAL DRAPES SUB-Q ICD

## (undated) DEVICE — SPONGE STICK WITH PVP-I: Brand: KENDALL

## (undated) DEVICE — INTENDED FOR TISSUE SEPARATION, AND OTHER PROCEDURES THAT REQUIRE A SHARP SURGICAL BLADE TO PUNCTURE OR CUT.: Brand: BARD-PARKER SAFETY BLADES SIZE 10, STERILE

## (undated) DEVICE — 3M™ TEGADERM™ TRANSPARENT FILM DRESSING FRAME STYLE, 1624W, 2-3/8 IN X 2-3/4 IN (6 CM X 7 CM), 100/CT 4CT/CASE: Brand: 3M™ TEGADERM™

## (undated) DEVICE — CHLORAPREP HI-LITE 26ML ORANGE

## (undated) DEVICE — GLOVE SRG BIOGEL 6.5

## (undated) DEVICE — BETHLEHEM UNIVERSAL BREAST PK: Brand: CARDINAL HEALTH

## (undated) DEVICE — BULB SYRINGE,IRRIGATION WITH PROTECTIVE CAP: Brand: DOVER

## (undated) DEVICE — SUT MONOCRYL 3-0 SH 27 IN Y416H

## (undated) DEVICE — MEDI-VAC YANK SUCT HNDL W/TPRD BULBOUS TIP: Brand: CARDINAL HEALTH

## (undated) DEVICE — SUT MONOCRYL 4-0 PS-2 27 IN Y426H

## (undated) DEVICE — LIGHT HANDLE COVER SLEEVE DISP BLUE STELLAR

## (undated) DEVICE — SYRINGE 20ML LL

## (undated) DEVICE — SPECIMEN CONTAINER STERILE PEEL PACK

## (undated) DEVICE — ABDOMINAL PAD: Brand: DERMACEA

## (undated) DEVICE — ANTIBACTERIAL UNDYED BRAIDED (POLYGLACTIN 910), SYNTHETIC ABSORBABLE SUTURE: Brand: COATED VICRYL

## (undated) DEVICE — VIAL DECANTER

## (undated) DEVICE — BOWL: 16OZ PEELPOUCH 75/CS 16/PLT: Brand: MEDEGEN MEDICAL PRODUCTS, LLC

## (undated) DEVICE — MODERATE PLUS PROFILE XTRA, M+X 405CC GEL SIZER: Brand: MENTOR MEMORYGEL XTRA RESTERILIZABLE GEL SIZER

## (undated) DEVICE — ADHESIVE SKIN HIGH VISCOSITY EXOFIN 1ML